# Patient Record
Sex: FEMALE | Race: OTHER
[De-identification: names, ages, dates, MRNs, and addresses within clinical notes are randomized per-mention and may not be internally consistent; named-entity substitution may affect disease eponyms.]

---

## 2018-04-11 ENCOUNTER — HOSPITAL ENCOUNTER (OUTPATIENT)
Dept: HOSPITAL 8 - OUT | Age: 53
End: 2018-04-11
Attending: ORTHOPAEDIC SURGERY
Payer: COMMERCIAL

## 2018-04-11 VITALS — HEIGHT: 65 IN | WEIGHT: 206.35 LBS | BODY MASS INDEX: 34.38 KG/M2

## 2018-04-11 VITALS — DIASTOLIC BLOOD PRESSURE: 87 MMHG | SYSTOLIC BLOOD PRESSURE: 152 MMHG
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
## 2018-04-11 DIAGNOSIS — Z85.6: ICD-10-CM
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
## 2018-04-11 DIAGNOSIS — S43.431A: ICD-10-CM

## 2018-04-11 DIAGNOSIS — M75.41: ICD-10-CM
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
## 2018-04-11 DIAGNOSIS — M75.111: Primary | ICD-10-CM

## 2018-04-11 DIAGNOSIS — E66.9: ICD-10-CM

## 2018-04-11 DIAGNOSIS — M19.011: ICD-10-CM

## 2018-04-11 DIAGNOSIS — M75.01: ICD-10-CM

## 2018-04-11 PROCEDURE — 29827 SHO ARTHRS SRG RT8TR CUF RPR: CPT

## 2018-04-11 PROCEDURE — 29826 SHO ARTHRS SRG DECOMPRESSION: CPT

## 2018-04-11 PROCEDURE — 29825 SHO ARTHRS SRG LSS&RESCJ ADS: CPT

## 2018-04-11 PROCEDURE — C1713 ANCHOR/SCREW BN/BN,TIS/BN: HCPCS

## 2018-04-11 PROCEDURE — 29824 SHO ARTHRS SRG DSTL CLAVICLC: CPT

## 2018-10-12 ENCOUNTER — HOSPITAL ENCOUNTER (EMERGENCY)
Dept: HOSPITAL 8 - ED | Age: 53
Discharge: HOME | End: 2018-10-12
Payer: COMMERCIAL

## 2018-10-12 VITALS — DIASTOLIC BLOOD PRESSURE: 98 MMHG | SYSTOLIC BLOOD PRESSURE: 168 MMHG

## 2018-10-12 VITALS — HEIGHT: 65 IN | WEIGHT: 216.27 LBS | BODY MASS INDEX: 36.03 KG/M2
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
## 2018-10-12 DIAGNOSIS — S39.012A: Primary | ICD-10-CM

## 2018-10-12 PROCEDURE — 99283 EMERGENCY DEPT VISIT LOW MDM: CPT

## 2018-10-12 PROCEDURE — 96372 THER/PROPH/DIAG INJ SC/IM: CPT

## 2019-05-03 ENCOUNTER — APPOINTMENT (OUTPATIENT)
Dept: RADIOLOGY | Facility: MEDICAL CENTER | Age: 54
End: 2019-05-03
Attending: EMERGENCY MEDICINE
Payer: COMMERCIAL

## 2019-05-03 ENCOUNTER — HOSPITAL ENCOUNTER (EMERGENCY)
Facility: MEDICAL CENTER | Age: 54
End: 2019-05-03
Attending: EMERGENCY MEDICINE
Payer: COMMERCIAL

## 2019-05-03 VITALS
OXYGEN SATURATION: 98 % | BODY MASS INDEX: 29.99 KG/M2 | DIASTOLIC BLOOD PRESSURE: 63 MMHG | WEIGHT: 180 LBS | HEART RATE: 74 BPM | SYSTOLIC BLOOD PRESSURE: 157 MMHG | RESPIRATION RATE: 16 BRPM | HEIGHT: 65 IN

## 2019-05-03 DIAGNOSIS — R93.0 ABNORMAL HEAD CT: ICD-10-CM
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
## 2019-05-03 DIAGNOSIS — M19.90 OSTEOARTHRITIS, UNSPECIFIED OSTEOARTHRITIS TYPE, UNSPECIFIED SITE: ICD-10-CM

## 2019-05-03 DIAGNOSIS — S09.90XA CLOSED HEAD INJURY, INITIAL ENCOUNTER: ICD-10-CM

## 2019-05-03 LAB
ALBUMIN SERPL BCP-MCNC: 4.3 G/DL (ref 3.2–4.9)
ALBUMIN/GLOB SERPL: 1.5 G/DL
ALP SERPL-CCNC: 74 U/L (ref 30–99)
ALT SERPL-CCNC: 16 U/L (ref 2–50)
ANION GAP SERPL CALC-SCNC: 13 MMOL/L (ref 0–11.9)
APPEARANCE UR: ABNORMAL
AST SERPL-CCNC: 16 U/L (ref 12–45)
BACTERIA #/AREA URNS HPF: NEGATIVE /HPF
BASOPHILS # BLD AUTO: 0.9 % (ref 0–1.8)
BASOPHILS # BLD: 0.1 K/UL (ref 0–0.12)
BILIRUB SERPL-MCNC: 0.5 MG/DL (ref 0.1–1.5)
BILIRUB UR QL STRIP.AUTO: NEGATIVE
BUN SERPL-MCNC: 22 MG/DL (ref 8–22)
CALCIUM SERPL-MCNC: 9.4 MG/DL (ref 8.5–10.5)
CHLORIDE SERPL-SCNC: 110 MMOL/L (ref 96–112)
CO2 SERPL-SCNC: 21 MMOL/L (ref 20–33)
COLOR UR: YELLOW
CREAT SERPL-MCNC: 0.84 MG/DL (ref 0.5–1.4)
EKG IMPRESSION: NORMAL
EOSINOPHIL # BLD AUTO: 0.13 K/UL (ref 0–0.51)
EOSINOPHIL NFR BLD: 1.2 % (ref 0–6.9)
EPI CELLS #/AREA URNS HPF: NEGATIVE /HPF
ERYTHROCYTE [DISTWIDTH] IN BLOOD BY AUTOMATED COUNT: 43.3 FL (ref 35.9–50)
GLOBULIN SER CALC-MCNC: 2.8 G/DL (ref 1.9–3.5)
GLUCOSE SERPL-MCNC: 120 MG/DL (ref 65–99)
GLUCOSE UR STRIP.AUTO-MCNC: NEGATIVE MG/DL
HCG SERPL QL: NEGATIVE
HCT VFR BLD AUTO: 41 % (ref 37–47)
HGB BLD-MCNC: 13.5 G/DL (ref 12–16)
HYALINE CASTS #/AREA URNS LPF: NORMAL /LPF
IMM GRANULOCYTES # BLD AUTO: 0.04 K/UL (ref 0–0.11)
IMM GRANULOCYTES NFR BLD AUTO: 0.4 % (ref 0–0.9)
KETONES UR STRIP.AUTO-MCNC: 15 MG/DL
LEUKOCYTE ESTERASE UR QL STRIP.AUTO: NEGATIVE
LIPASE SERPL-CCNC: 21 U/L (ref 11–82)
LYMPHOCYTES # BLD AUTO: 2.12 K/UL (ref 1–4.8)
LYMPHOCYTES NFR BLD: 19.9 % (ref 22–41)
MCH RBC QN AUTO: 29 PG (ref 27–33)
MCHC RBC AUTO-ENTMCNC: 32.9 G/DL (ref 33.6–35)
MCV RBC AUTO: 88 FL (ref 81.4–97.8)
MICRO URNS: ABNORMAL
MONOCYTES # BLD AUTO: 0.54 K/UL (ref 0–0.85)
MONOCYTES NFR BLD AUTO: 5.1 % (ref 0–13.4)
NEUTROPHILS # BLD AUTO: 7.72 K/UL (ref 2–7.15)
NEUTROPHILS NFR BLD: 72.5 % (ref 44–72)
NITRITE UR QL STRIP.AUTO: NEGATIVE
NRBC # BLD AUTO: 0 K/UL
NRBC BLD-RTO: 0 /100 WBC
PH UR STRIP.AUTO: 8.5 [PH]
PLATELET # BLD AUTO: 328 K/UL (ref 164–446)
PMV BLD AUTO: 10.4 FL (ref 9–12.9)
POTASSIUM SERPL-SCNC: 3.4 MMOL/L (ref 3.6–5.5)
PROT SERPL-MCNC: 7.1 G/DL (ref 6–8.2)
PROT UR QL STRIP: NEGATIVE MG/DL
RBC # BLD AUTO: 4.66 M/UL (ref 4.2–5.4)
RBC # URNS HPF: NORMAL /HPF
RBC UR QL AUTO: NEGATIVE
SODIUM SERPL-SCNC: 144 MMOL/L (ref 135–145)
SP GR UR STRIP.AUTO: 1.02
UROBILINOGEN UR STRIP.AUTO-MCNC: 0.2 MG/DL
WBC # BLD AUTO: 10.7 K/UL (ref 4.8–10.8)
WBC #/AREA URNS HPF: NORMAL /HPF

## 2019-05-03 PROCEDURE — 73562 X-RAY EXAM OF KNEE 3: CPT | Mod: LT

## 2019-05-03 PROCEDURE — 80053 COMPREHEN METABOLIC PANEL: CPT

## 2019-05-03 PROCEDURE — 70450 CT HEAD/BRAIN W/O DYE: CPT

## 2019-05-03 PROCEDURE — 81001 URINALYSIS AUTO W/SCOPE: CPT

## 2019-05-03 PROCEDURE — 96374 THER/PROPH/DIAG INJ IV PUSH: CPT

## 2019-05-03 PROCEDURE — 73562 X-RAY EXAM OF KNEE 3: CPT | Mod: RT

## 2019-05-03 PROCEDURE — 99285 EMERGENCY DEPT VISIT HI MDM: CPT

## 2019-05-03 PROCEDURE — 84703 CHORIONIC GONADOTROPIN ASSAY: CPT

## 2019-05-03 PROCEDURE — 85025 COMPLETE CBC W/AUTO DIFF WBC: CPT

## 2019-05-03 PROCEDURE — 96375 TX/PRO/DX INJ NEW DRUG ADDON: CPT

## 2019-05-03 PROCEDURE — 73030 X-RAY EXAM OF SHOULDER: CPT | Mod: RT

## 2019-05-03 PROCEDURE — 700111 HCHG RX REV CODE 636 W/ 250 OVERRIDE (IP): Performed by: EMERGENCY MEDICINE

## 2019-05-03 PROCEDURE — 93005 ELECTROCARDIOGRAM TRACING: CPT | Performed by: EMERGENCY MEDICINE

## 2019-05-03 PROCEDURE — 83690 ASSAY OF LIPASE: CPT

## 2019-05-03 PROCEDURE — 71045 X-RAY EXAM CHEST 1 VIEW: CPT

## 2019-05-03 PROCEDURE — 72131 CT LUMBAR SPINE W/O DYE: CPT

## 2019-05-03 PROCEDURE — 36415 COLL VENOUS BLD VENIPUNCTURE: CPT

## 2019-05-03 RX ORDER — ONDANSETRON 2 MG/ML
4 INJECTION INTRAMUSCULAR; INTRAVENOUS ONCE
Status: COMPLETED | OUTPATIENT
Start: 2019-05-03 | End: 2019-05-03

## 2019-05-03 RX ORDER — IBUPROFEN 600 MG/1
600 TABLET ORAL EVERY 8 HOURS PRN
Qty: 15 TAB | Refills: 0 | Status: SHIPPED | OUTPATIENT
Start: 2019-05-03 | End: 2019-05-08

## 2019-05-03 RX ORDER — KETOROLAC TROMETHAMINE 30 MG/ML
30 INJECTION, SOLUTION INTRAMUSCULAR; INTRAVENOUS ONCE
Status: COMPLETED | OUTPATIENT
Start: 2019-05-03 | End: 2019-05-03

## 2019-05-03 RX ORDER — ACETAMINOPHEN 325 MG/1
650 TABLET ORAL EVERY 6 HOURS PRN
Qty: 16 TAB | Refills: 0 | Status: SHIPPED | OUTPATIENT
Start: 2019-05-03 | End: 2019-05-07

## 2019-05-03 RX ADMIN — ONDANSETRON 4 MG: 2 INJECTION INTRAMUSCULAR; INTRAVENOUS at 16:56

## 2019-05-03 RX ADMIN — KETOROLAC TROMETHAMINE 30 MG: 30 INJECTION, SOLUTION INTRAMUSCULAR at 19:34

## 2019-05-03 ASSESSMENT — LIFESTYLE VARIABLES
EVER FELT BAD OR GUILTY ABOUT YOUR DRINKING: NO
EVER HAD A DRINK FIRST THING IN THE MORNING TO STEADY YOUR NERVES TO GET RID OF A HANGOVER: NO
HAVE YOU EVER FELT YOU SHOULD CUT DOWN ON YOUR DRINKING: NO
TOTAL SCORE: 0
DO YOU DRINK ALCOHOL: YES
HAVE PEOPLE ANNOYED YOU BY CRITICIZING YOUR DRINKING: NO
CONSUMPTION TOTAL: INCOMPLETE
TOTAL SCORE: 0
TOTAL SCORE: 0

## 2019-05-03 NOTE — ED PROVIDER NOTES
ED Provider Note    CHIEF COMPLAINT  Fall    [facility]  Gabriela Prince is a 53 y.o. female who presents to the emergency department after a fall.  History is obtained using the iPad  as the patient is Citizen of the [place]speaking.  The patient states that she was at work earlier today when she was leaving work and tripped over the edge of the carpet.  She states that she fell onto a fence and landed on her knees.  She states that she also hit her head on the fence but did not have any loss of consciousness.  She is currently complaining of bilateral knee pain, right shoulder pain, headache, and back pain.  She also states that she was unable to get up as she felt weak and numb all over.  EMS was called by 1 of her coworkers, and she was given ketamine and fentanyl.  She did receive Zofran upon arrival here as she was nauseated and vomiting.  She denies taking any blood thinners and states that she has otherwise been well prior to the fall.  She denies any recent fevers, coughing, chest pain, shortness of breath, abdominal pain, or vomiting.    REVIEW OF SYSTEMS  See HPI for further details. All other systems are negative.     PAST MEDICAL HISTORY   has a past medical history of Cancer (HCC); Hodgkin's disease; Sleep apnea (2011); Snoring; and Unspecified disorder of thyroid.    SOCIAL HISTORY  Social History     Social History Main Topics   • Smoking status: Never Smoker   • Smokeless tobacco: Never Used   • Alcohol use Yes      Comment: occasionally   • Drug use: No   • Sexual activity: Not Currently      Comment:        SURGICAL HISTORY   has a past surgical history that includes  delivery only (); tubal ligation; pelviscopy (3/7/2011); salpingectomy (3/7/2011); and primary c section.    CURRENT MEDICATIONS  Home Medications     Reviewed by Nicole Watson R.N. (Registered Nurse) on 19 at 1613  Med List Status: Complete   Medication Last Dose Status   Ferrous Sulfate (IRON)  "325 (65 FE) MG TABS not taking Active   hydrocodone/acetaminophen (NORCO)  MG TABS not taking Active   OMEPRAZOLE PO not taking Active   VITAMIN D PO not takin Active                ALLERGIES  No Known Allergies    PHYSICAL EXAM  VITAL SIGNS: /63   Pulse 69   Resp 16   Ht 1.651 m (5' 5\")   Wt 81.6 kg (180 lb)   SpO2 99%   BMI 29.95 kg/m²    Constitutional: The patient is alert and laying on the gurney.  She is tearful and appears anxious.  She occasionally retches into an emesis bag.  HENT: Normocephalic atraumatic.  No arguelles signs or raccoon's eyes are noted.  Bilateral external ears normal.  Bilateral TMs are normal with no evidence of hemotympanum.  Nose normal. Mucous membranes are moist.  Eyes: Pupils are equal and reactive. Conjunctiva normal. Non-icteric sclera.   Neck: Normal range of motion without tenderness. Supple. No midline tenderness to palpation.  Cardiovascular: Regular rate and rhythm. No murmurs, gallops or rubs.  Thorax & Lungs: Breath sounds are clear to auscultation bilaterally. No wheezing, rhonchi or rales.  Abdomen: Soft, nontender and nondistended. No peritoneal signs noted.  Rectal: Larry - Sil ED RN.  Normal rectal tone, no gross bleeding.  Skin: Warm and dry. No rashes are noted.  Back: There is minimal tenderness palpation in the lumbar spine, midline.  Extremities: 2+ peripheral pulses. Cap refill is less than 2 seconds. No edema, cyanosis, or clubbing.  Musculoskeletal: Good range of motion in all major joints. No tenderness to palpation or major deformities noted.  She has minimal tenderness to palpation of her bilateral knees, and she is able to actively flex and extend the knees.  No pain with internal/external rotation at the hips bilaterally.  Neurologic: Alert and oriented ×3.  No facial asymmetry is noted.  The patient has 5 out of 5 muscle strength in bilateral upper extremities.  She has 4 out of 5 muscle strength in bilateral lower extremities but " when distracted seems to have 5 out of 5 muscle strength.  She states that she is numb from head to toe but is able to localize where I touch her.  Psychiatric: Affect is anxious. Judgment appears to be intact.      DIAGNOSTIC STUDIES / PROCEDURES    LABS  Results for orders placed or performed during the hospital encounter of 05/03/19   CBC WITH DIFFERENTIAL   Result Value Ref Range    WBC 10.7 4.8 - 10.8 K/uL    RBC 4.66 4.20 - 5.40 M/uL    Hemoglobin 13.5 12.0 - 16.0 g/dL    Hematocrit 41.0 37.0 - 47.0 %    MCV 88.0 81.4 - 97.8 fL    MCH 29.0 27.0 - 33.0 pg    MCHC 32.9 (L) 33.6 - 35.0 g/dL    RDW 43.3 35.9 - 50.0 fL    Platelet Count 328 164 - 446 K/uL    MPV 10.4 9.0 - 12.9 fL    Neutrophils-Polys 72.50 (H) 44.00 - 72.00 %    Lymphocytes 19.90 (L) 22.00 - 41.00 %    Monocytes 5.10 0.00 - 13.40 %    Eosinophils 1.20 0.00 - 6.90 %    Basophils 0.90 0.00 - 1.80 %    Immature Granulocytes 0.40 0.00 - 0.90 %    Nucleated RBC 0.00 /100 WBC    Neutrophils (Absolute) 7.72 (H) 2.00 - 7.15 K/uL    Lymphs (Absolute) 2.12 1.00 - 4.80 K/uL    Monos (Absolute) 0.54 0.00 - 0.85 K/uL    Eos (Absolute) 0.13 0.00 - 0.51 K/uL    Baso (Absolute) 0.10 0.00 - 0.12 K/uL    Immature Granulocytes (abs) 0.04 0.00 - 0.11 K/uL    NRBC (Absolute) 0.00 K/uL   COMP METABOLIC PANEL   Result Value Ref Range    Sodium 144 135 - 145 mmol/L    Potassium 3.4 (L) 3.6 - 5.5 mmol/L    Chloride 110 96 - 112 mmol/L    Co2 21 20 - 33 mmol/L    Anion Gap 13.0 (H) 0.0 - 11.9    Glucose 120 (H) 65 - 99 mg/dL    Bun 22 8 - 22 mg/dL    Creatinine 0.84 0.50 - 1.40 mg/dL    Calcium 9.4 8.5 - 10.5 mg/dL    AST(SGOT) 16 12 - 45 U/L    ALT(SGPT) 16 2 - 50 U/L    Alkaline Phosphatase 74 30 - 99 U/L    Total Bilirubin 0.5 0.1 - 1.5 mg/dL    Albumin 4.3 3.2 - 4.9 g/dL    Total Protein 7.1 6.0 - 8.2 g/dL    Globulin 2.8 1.9 - 3.5 g/dL    A-G Ratio 1.5 g/dL   LIPASE   Result Value Ref Range    Lipase 21 11 - 82 U/L   URINALYSIS CULTURE, IF INDICATED   Result Value Ref  Range    Color Yellow     Character Cloudy (A)     Specific Gravity 1.016 <1.035    Ph 8.5 (A) 5.0 - 8.0    Glucose Negative Negative mg/dL    Ketones 15 (A) Negative mg/dL    Protein Negative Negative mg/dL    Bilirubin Negative Negative    Urobilinogen, Urine 0.2 Negative    Nitrite Negative Negative    Leukocyte Esterase Negative Negative    Occult Blood Negative Negative    Micro Urine Req Microscopic    HCG QUAL SERUM   Result Value Ref Range    Beta-Hcg Qualitative Serum Negative Negative   ESTIMATED GFR   Result Value Ref Range    GFR If African American >60 >60 mL/min/1.73 m 2    GFR If Non African American >60 >60 mL/min/1.73 m 2   URINE MICROSCOPIC (W/UA)   Result Value Ref Range    WBC 0-2 /hpf    RBC 0-2 /hpf    Bacteria Negative None /hpf    Epithelial Cells Negative /hpf    Hyaline Cast 0-2 /lpf   EKG   Result Value Ref Range    Report       [facility] Emergency Dept.    Test Date:  2019  Pt Name:    HANNAH TORIBIO        Department: ER  MRN:        [id number]                      Room:       [facility]  Gender:     Female                       Technician: [id number]  :        1965                   Requested By:KATE BONNER  Order #:    [id number]                    Reading MD: Kate Bonner    Measurements  Intervals                                Axis  Rate:       71                           P:          53  MI:         124                          QRS:        6  QRSD:       74                           T:          26  QT:         468  QTc:        509    Interpretive Statements  SINUS RHYTHM  No evidence of ischemia or arrhythmia is present.  The initial QTC as  cannulated  by the computer was 509.  I manually recalculated this and found it to be  480.  No previous ECG available for comparison.    Electronically Signed On 5-3-2019 17:37:29 PDT by Kate Bonner       RADIOLOGY  CT-LSPINE W/O PLUS RECONS   Final Result      NO ACUTE FRACTURE OR DISLOCATION IS PRESENT IN THE LUMBAR  SPINE.      CT-HEAD W/O   Final Result         NO ACUTE ABNORMALITIES ARE NOTED ON CT SCAN OF THE HEAD.      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      DX-CHEST-PORTABLE (1 VIEW)   Final Result         No acute cardiac or pulmonary abnormality is identified.      DX-SHOULDER 2+ RIGHT   Final Result      There is no evidence of acute fracture.   Osteoarthritis is present.      DX-KNEE 3 VIEWS RIGHT   Final Result      No evidence of fracture or dislocation.   Mild osteoarthritis.      DX-KNEE 3 VIEWS LEFT   Final Result      No evidence of fracture or dislocation.   Mild osteoarthritis.        COURSE & MEDICAL DECISION MAKING  Pertinent Labs & Imaging studies reviewed. (See chart for details)    This is a 53-year-old female presenting to the emergency department for evaluation after a fall.  On initial evaluation, the patient appeared anxious and uncomfortable.  She was intermittently retching into an emesis bag.  Her vital signs were normal.  Physical exam was notable for minimal tenderness palpation in the lumbar spine, but no additional obvious traumatic injuries were noted although physical exam was somewhat difficult secondary to the patient's anxiousness and poor cooperation.  A CT of the head was performed given a history of hitting her head.  This did not reveal any intracranial hemorrhage.  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  I did disclose this information with her and listed in her diagnoses.  A CT of the lumbar spine was also ordered given her tenderness in this area although she was grossly neurologically intact.  No acute fractures or dislocations of the lumbar spine were noted.  Plain films of the knees and right shoulder were also obtained given that this is where she was complaining of pain.  No acute fractures were noted, but mild osteoarthritis was noted and likely contributing to some of her pain.  Her blood work was  reassuring.  EKG did not reveal any evidence of ischemia or arrhythmia.    8:04 PM - Patient reassessed and stated that her pain has completely resolved.  She is resting comfortably on the gurney with normal vital signs.    The patient was able to ambulate without assistance while in the emergency department.  Given the resolution of her symptoms and benign work-up here in the emergency department as well as mild mechanism of injury, I do believe that she is stable for discharge and encouraged her to follow-up with [facility].  She is given a prescription for Tylenol and ibuprofen and understands return to the ED with any worsening signs or symptoms.    FINAL IMPRESSION  1. Fall, initial encounter    2. Closed head injury, initial encounter    3. Abnormal head CT    4. Osteoarthritis, unspecified osteoarthritis type, unspecified site      PRESCRIPTIONS  New Prescriptions    No medications on file     FOLLOW UP  [address] [address]  [phone]  Call in 1 day  To schedule a follow up appointment    [facility], Emergency Dept  [address] [address]  [phone]  Go to   As needed    -DISCHARGE-           Electronically signed by: Kate Torres, 5/3/2019 4:29 PM

## 2019-05-03 NOTE — ED TRIAGE NOTES
Chief Complaint   Patient presents with   • GLF       BIB by ems for above complaint. Patient has a ground level fall after she tripped over something after work. A+Ox4. States she has head pain, pain in both in knees, and right shoulder pain. Patient states that she does not have any feeling in her legs. Patient assessed and CMS intact in bilateral feet. Patient was given 25 of Ketamine, 10 mcg of fentanyl, and 4mg Zofran. Patient is vomiting during triage. Placed on monitor and in gown.

## 2019-05-03 NOTE — LETTER
"  FORM C-4:  EMPLOYEE’S CLAIM FOR COMPENSATION/ REPORT OF INITIAL TREATMENT  EMPLOYEE’S CLAIM - PROVIDE ALL INFORMATION REQUESTED   First Name  Gabriela Last Name  Po Prince Birthdate             Age  1965 53 y.o. 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 (if applicable)  SPEAKING WITH THE OFFICE STAFF    How did this injury or occupational disease occur? Be specific and answer in detail. Use additional sheet if necessary)  I WAS LEAVING WORK OFFICE AND FELL DUE TO THE MAT AND INJURED   MYSELF ON THE RAILING AND INJURED MY RIGHT ARM AND FACE AND KNEES.   If you believe that you have an occupational disease, when did you first have knowledge of the disability and it relationship to your employment?  N/A Witnesses to the Accident  [employer] OFFICE STAFF     Nature of Injury or Occupational Disease  Concussion  Part(s) of Body Injured or Affected  Skull, Lower Arm (R), Knee (R)    I certify that the above is true and correct to " the best of my knowledge and that I have provided this information in order to obtain the benefits of [place]  I hereby authorize any physician, chiropractor, surgeon, practitioner, or other person, any hospital, including [facility], any medical service organization, any insurance company, or other institution or organization to release to each other, any medical or other information, including benefits paid or payable, pertinent to this injury or disease, except information relative to diagnosis, treatment and/or counseling for AIDS, psychological conditions, alcohol or controlled substances, for which I must give specific authorization.  A Photostat of this authorization shall be as valid as the original.   Date Place [facility]   Employee’s Signature   THIS REPORT MUST BE COMPLETED AND MAILED WITHIN 3 WORKING DAYS OF TREATMENT   Place  [facility], EMERGENCY DEPT  Name of Facility   [facility]   Date  5/3/2019 Diagnosis  (W19.XXXA) Fall, initial encounter  (S09.90XA) Closed head injury, initial encounter  (R93.0) Abnormal head CT  (M19.90) Osteoarthritis, unspecified osteoarthritis type, unspecified site Is there evidence the injured employee was under the influence of alcohol and/or another controlled substance at the time of accident?   Hour  5:19 PM Description of Injury or Disease  Fall, initial encounter  Closed head injury, initial encounter  Abnormal head CT  Osteoarthritis, unspecified osteoarthritis type, unspecified site     Treatment     Have you advised the patient to remain off work five days or more?             X-Ray Findings      If Yes   From Date    To Date      From information given by the employee, together with medical evidence, can you directly connect this injury or occupational disease as  "job incurred?    If No, is the employee capable of: Full Duty    Modified Duty      Is additional medical care by a physician indicated?    If Modified Duty, Specify any Limitations / Restrictions        Do you know of any previous injury or disease contributing to this condition or occupational disease?      Date  5/13/2019 Print Doctor’s Name  Melissa Kate HERNANDEZ RAHUL certify the employer’s copy of this form was mailed on:   Address  [address] [place] [address] [phone] Insurer’s Use Only   [facility]  [address]    Provider’s Tax ID Number  [id number] Telephone  Dept: [phone]    Doctor’s Signature    Degree   MD    Original - TREATING PHYSICIAN OR CHIROPRACTOR   Pg 2-Insurer/TPA   Pg 3-Employer   Pg 4-Employee                                                                                                  Form C-4 (rev01/03)     BRIEF DESCRIPTION OF RIGHTS AND BENEFITS  (Pursuant to [place]RS 616C.050)    Notice of Injury or Occupational Disease (Incident Report Form C-1): If an injury or occupational disease (OD) arises out of and in the course of employment, you must provide written notice to your employer as soon as practicable, but no later than 7 days after the accident or OD. Your employer shall maintain a sufficient supply of the required forms.    Claim for Compensation (Form C-4): If medical treatment is sought, the form C-4 is available at the place of initial treatment. A completed \"Claim for Compensation\" (Form C-4) must be filed within 90 days after an accident or OD. The treating physician or chiropractor must, within 3 working days after treatment, complete and mail to the employer, the employer's insurer and third-party , the Claim for Compensation.    Medical Treatment: If you require medical treatment for your on-the-job injury or OD, you may be required to select a physician or chiropractor from a list provided by your workers’ compensation insurer, if it has " contracted with an Organization for Managed Care (MCO) or Preferred Provider Organization (PPO) or providers of health care. If your employer has not entered into a contract with an MCO or PPO, you may select a physician or chiropractor from the Panel of Physicians and Chiropractors. Any medical costs related to your industrial injury or OD will be paid by your insurer.    Temporary Total Disability (TTD): If your doctor has certified that you are unable to work for a period of at least 5 consecutive days, or 5 cumulative days in a 20-day period, or places restrictions on you that your employer does not accommodate, you may be entitled to TTD compensation.    Temporary Partial Disability (TPD): If the wage you receive upon reemployment is less than the compensation for TTD to which you are entitled, the insurer may be required to pay you TPD compensation to make up the difference. TPD can only be paid for a maximum of 24 months.    Permanent Partial Disability (PPD): When your medical condition is stable and there is an indication of a PPD as a result of your injury or OD, within 30 days, your insurer must arrange for an evaluation by a rating physician or chiropractor to determine the degree of your PPD. The amount of your PPD award depends on the date of injury, the results of the PPD evaluation and your age and wage.    Permanent Total Disability (PTD): If you are medically certified by a treating physician or chiropractor as permanently and totally disabled and have been granted a PTD status by your insurer, you are entitled to receive monthly benefits not to exceed 66 2/3% of your average monthly wage. The amount of your PTD payments is subject to reduction if you previously received a PPD award.    Vocational Rehabilitation Services: You may be eligible for vocational rehabilitation services if you are unable to return to the job due to a permanent physical impairment or permanent restrictions as a result of  your injury or occupational disease.    Transportation and Per [place] Reimbursement: You may be eligible for travel expenses and per [place] associated with medical treatment.  Reopening: You may be able to reopen your claim if your condition worsens after claim closure.    Appeal Process: If you disagree with a written determination issued by the insurer or the insurer does not respond to your request, you may appeal to the Department of Administration, , by following the instructions contained in your determination letter. You must appeal the determination within 70 days from the date of the determination letter at [address], [address], [place], [place] [address], or [address], [address], [place], [place] [address]. If you disagree with the  decision, you may appeal to the Department of Administration, . You must file your appeal within 30 days from the date of the  decision letter at [address], [address], [place], [place] [address], or [address], [facility] [address], [place] [address]. If you disagree with a decision of an , you may file a petition for judicial review with the District Court. You must do so within 30 days of the Appeal Officer’s decision. You may be represented by an  at your own expense or you may contact the [facility] for possible representation.    [place]  for Injured Workers (NAIW): If you disagree with a  decision, you may request that NAIW represent you without charge at an  Hearing. For information regarding denial of benefits, you may contact the [facility] at: [address], [address], [place] [address], [phone], or [address] [facility] [address], [place] [address], [phone]    To File a Complaint with the Division: If you wish to file a complaint with the  of the Division of Industrial Relations (DIR),  please contact the Workers’ Compensation Section, [address], [address], [place], [place] [address], telephone (180) [phone], or [address], [address], [place], [place] [address], telephone [phone]) [phone].    For assistance with Workers’ Compensation Issues: you may contact the Office of the [facility] Consumer Health Assistance, [address] [place] [address], [address], [place], [place] [address], Toll Free 1-614.367.3827, Web site: http://govcha.[facility].nv., E-mail [email or web address].                                                                                                                                                                               __________________________________________________________________                                    _________________            Employee Name / Signature                                                                                                                            Date                                       D-2 (rev. 10/07)

## 2019-05-04 NOTE — ED NOTES
Urine specimen provided by patient sent to lab per orders.    Patient states that pain has improved to 5/10 since receiving medication per orders. No behavioral pain indicators noted.    Denies further needs at this time. Call bell within reach.

## 2019-05-04 NOTE — ED NOTES
Pt evaluated by ERP. PIV line established. Blood drawn and sent to the lab. Pt medicated for N/V, see MAR. Family at bedside.

## 2019-05-04 NOTE — ED NOTES
Pt and family updated on POC. Pt unable to ambulate d/t pain, informed urine sample needed. ERP notified. Report given to CHAPARRO Whiting

## 2019-05-04 NOTE — ED NOTES
Patient resting on gurney. No acute distress. Calm, cooperative, conversing appropriately with staff. States that pain has improved to 8/10 since receiving medication per orders. No behavioral pain indicators noted. Informed of need for urine specimen per orders. States that she does not feel that she can ambulate at this time due to pain - states she will try after receiving addition al medication. Medicated with Toradol per orders. Denies further needs at this time. Call bell within reach. Family remains at bedside. Patient and family updated regarding plan of care.

## 2019-05-04 NOTE — ED NOTES
Patient ambulatory to BR with steady gait with constant SBA by ED Tech. Specimen container provided and instructed on clean catch urine sample - verbalized understanding.

## 2019-05-04 NOTE — ED NOTES
Patient discharged in stable condition per orders. IV access removed - bandage applied. Wristband removed per protocol. Patient verbalized understanding of all discharge instructions. All belongings accounted for. Ambulatory to lobby with steady gait accompanied by family.

## 2019-08-26 ENCOUNTER — OCCUPATIONAL MEDICINE (OUTPATIENT)
Dept: OCCUPATIONAL MEDICINE | Facility: CLINIC | Age: 54
End: 2019-08-26
Payer: COMMERCIAL

## 2019-08-26 VITALS
WEIGHT: 200 LBS | SYSTOLIC BLOOD PRESSURE: 120 MMHG | OXYGEN SATURATION: 98 % | TEMPERATURE: 98.6 F | DIASTOLIC BLOOD PRESSURE: 88 MMHG | HEIGHT: 65 IN | BODY MASS INDEX: 33.32 KG/M2 | HEART RATE: 78 BPM

## 2019-08-26 DIAGNOSIS — F07.81 POST CONCUSSIVE SYNDROME: ICD-10-CM

## 2019-08-26 PROCEDURE — 99203 OFFICE O/P NEW LOW 30 MIN: CPT | Performed by: PREVENTIVE MEDICINE

## 2019-08-26 NOTE — LETTER
[address],   [address] [place] [address]  Phone:  [phone] - Fax:  [phone]   [facility] Progress Report and Disability Certification  Date of Service: 8/26/2019   No Show:  No  Date / Time of Next Visit:  [facility]   Claim Information   Patient Name: Gabriela Prince  Claim Number:     Employer:   [employer] Date of Injury: 5/3/2019     Insurer / TPA: Feliz Vogt  ID / SSN:     Occupation: PRODUCTION  Diagnosis: The encounter diagnosis was Post concussive syndrome.    Medical Information   Related to Industrial Injury? Yes    Subjective Complaints:  DOI 5/3/2019: 55 yo female presents with right knee, head and right shoulder injury. She was leaving work and tripped over the edge of the carpet.  She states that she fell onto a fence and landed on her knees. She was seen in ER and [facility].  Patient was treated at [facility] for few months.  She states she was just referred to orthopedist for her right shoulder and to a neurologist for her postconcussive syndrome.  She states that she was told that this appointment was to be seen by neurologist.  Patient states she continues to have headaches and feeling of weight upon her head and neck.  She had MRIs were essentially normal.   Objective Findings: Neuro: Alert oriented x3.  Cranial nerves grossly intact.  EOMI.   Pre-Existing Condition(s):     Assessment:   Condition Same    Status: Discharged / Care Transfer  Permanent Disability:No    Plan:      Diagnostics:      Comments:  Patient currently treating with [facility] for postconcussive syndrome, she is under the belief that she was to be seen by neurologist here at this appointment.  I Am unsure exactly why patient was sent here, however I do agree with referral to comfort cho.  Recommend Dr. Vital if available  Advised patient to continue care with [facility] unless she desires to transfer care over here  Advised  patient to contact work comp insurance for further clarification of status of neurology referral  Restrict  ions per [facility]  Please excuse patient from work this afternoon, 8/26/2019 due to her coming to this appointment    Disability Information   Status: Released to Restricted Duty    From:  8/26/2019  Through:   Restrictions are: Temporary   Physical Restrictions   Sitting:    Standing:    Stooping:    Bending:      Squatting:    Walking:    Climbing:    Pushing:      Pulling:    Other:    Reaching Above Shoulder (L):   Reaching Above Shoulder (R):       Reaching Below Shoulder (L):    Reaching Below Shoulder (R):      Not to exceed Weight Limits   Carrying(hrs):   Weight Limit(lb):   Lifting(hrs):   Weight  Limit(lb):     Comments: Per [facility]opedics    Repetitive Actions   Hands: i.e. Fine Manipulations from Grasping:     Feet: i.e. Operating Foot Controls:     Driving / Operate Machinery:     Physician Name: Noe Hong D.O. Physician Signature: [email or web address] HONG D.O. e-Signature: Dr. Baljeet John, Medical Director   Clinic Name / Location: [address],   [address] [place] [address] Clinic Phone Number: Dept: [phone]   Appointment Time: 2:00 Pm Visit Start Time: 1:56 PM   Check-In Time:  1:55 Pm Visit Discharge Time: 2:31 PM   Original-Treating Physician or Chiropractor    Page 2-Insurer/TPA    Page 3-Employer    Page 4-Employee

## 2019-08-26 NOTE — PROGRESS NOTES
"Subjective:      Gabriela Prince is a 54 y.o. female who presents with Other (WC DOI 5/3/19 head, shoulder, knee feeling the same, room 16)      DOI 5/3/2019: 53 yo female presents with right knee, head and right shoulder injury. She was leaving work and tripped over the edge of the carpet.  She states that she fell onto a fence and landed on her knees. She was seen in ER and [facility].  Patient was treated at [facility] for few months.  She states she was just referred to orthopedist for her right shoulder and to a neurologist for her postconcussive syndrome.  She states that she was told that this appointment was to be seen by neurologist.  Patient states she continues to have headaches and feeling of weight upon her head and neck.  She had MRIs were essentially normal.     HPI    ROS  ROS: All systems were reviewed on intake form, form was reviewed and signed. See scanned documents in media. Pertinent positives and negatives included in HPI.    PMH: No pertinent past medical history to this problem  MEDS: Medications were reviewed in Epic  ALLERGIES: No Known Allergies  SOCHX: Works as a fresh  at [employer]   FH: No pertinent family history to this problem     Objective:     /88   Pulse 78   Temp 37 °C (98.6 °F)   Ht 1.651 m (5' 5\")   Wt 90.7 kg (200 lb)   SpO2 98%   BMI 33.28 kg/m²      Physical Exam   Constitutional: She is oriented to person, place, and time. She appears well-developed and well-nourished.   HENT:   Right Ear: External ear normal.   Left Ear: External ear normal.   Eyes: Conjunctivae and EOM are normal.   Cardiovascular: Normal rate.   Pulmonary/Chest: Effort normal.   Neurological: She is alert and oriented to person, place, and time.   Skin: Skin is warm and dry.   Psychiatric: She has a normal mood and affect. Her behavior is normal. Judgment normal.       Neuro: Alert oriented x3.  Cranial nerves grossly intact.  EOMI.       Assessment/Plan:     1. Post concussive " syndrome  Patient currently treating with [facility] for postconcussive syndrome, she is under the belief that she was to be seen by neurologist here at this appointment.  I Am unsure exactly why patient was sent here, however I do agree with referral to neurology.  Recommend Dr. Vital if available  Advised patient to continue care with [facility] unless she desires to transfer care over here  Advised patient to contact work comp insurance for further clarification of status of neurology referral  Restrictions per [facility]/orthopedics  Please excuse patient from work this afternoon, 8/26/2019 due to her coming to this appointment

## 2019-11-06 ENCOUNTER — HOSPITAL ENCOUNTER (EMERGENCY)
Facility: MEDICAL CENTER | Age: 54
End: 2019-11-06
Attending: EMERGENCY MEDICINE
Payer: COMMERCIAL

## 2019-11-06 ENCOUNTER — APPOINTMENT (OUTPATIENT)
Dept: RADIOLOGY | Facility: MEDICAL CENTER | Age: 54
End: 2019-11-06
Attending: EMERGENCY MEDICINE
Payer: COMMERCIAL

## 2019-11-06 VITALS
DIASTOLIC BLOOD PRESSURE: 101 MMHG | HEIGHT: 65 IN | TEMPERATURE: 98.7 F | HEART RATE: 67 BPM | SYSTOLIC BLOOD PRESSURE: 149 MMHG | RESPIRATION RATE: 16 BRPM | BODY MASS INDEX: 34.78 KG/M2 | WEIGHT: 208.78 LBS | OXYGEN SATURATION: 98 %

## 2019-11-06 DIAGNOSIS — B34.9 VIRAL ILLNESS: ICD-10-CM

## 2019-11-06 DIAGNOSIS — E87.6 HYPOKALEMIA: ICD-10-CM

## 2019-11-06 LAB
ALBUMIN SERPL BCP-MCNC: 4.3 G/DL (ref 3.2–4.9)
ALBUMIN/GLOB SERPL: 1.5 G/DL
ALP SERPL-CCNC: 78 U/L (ref 30–99)
ALT SERPL-CCNC: 12 U/L (ref 2–50)
ANION GAP SERPL CALC-SCNC: 11 MMOL/L (ref 0–11.9)
APPEARANCE UR: CLEAR
AST SERPL-CCNC: 13 U/L (ref 12–45)
BACTERIA #/AREA URNS HPF: NEGATIVE /HPF
BASOPHILS # BLD AUTO: 1 % (ref 0–1.8)
BASOPHILS # BLD: 0.09 K/UL (ref 0–0.12)
BILIRUB SERPL-MCNC: 0.3 MG/DL (ref 0.1–1.5)
BILIRUB UR QL STRIP.AUTO: NEGATIVE
BUN SERPL-MCNC: 15 MG/DL (ref 8–22)
CALCIUM SERPL-MCNC: 9.6 MG/DL (ref 8.5–10.5)
CHLORIDE SERPL-SCNC: 109 MMOL/L (ref 96–112)
CO2 SERPL-SCNC: 23 MMOL/L (ref 20–33)
COLOR UR: YELLOW
CREAT SERPL-MCNC: 0.65 MG/DL (ref 0.5–1.4)
EOSINOPHIL # BLD AUTO: 0.3 K/UL (ref 0–0.51)
EOSINOPHIL NFR BLD: 3.2 % (ref 0–6.9)
EPI CELLS #/AREA URNS HPF: NEGATIVE /HPF
ERYTHROCYTE [DISTWIDTH] IN BLOOD BY AUTOMATED COUNT: 41.1 FL (ref 35.9–50)
FLUAV RNA SPEC QL NAA+PROBE: NEGATIVE
FLUBV RNA SPEC QL NAA+PROBE: NEGATIVE
GLOBULIN SER CALC-MCNC: 2.9 G/DL (ref 1.9–3.5)
GLUCOSE SERPL-MCNC: 101 MG/DL (ref 65–99)
GLUCOSE UR STRIP.AUTO-MCNC: NEGATIVE MG/DL
HCT VFR BLD AUTO: 40.7 % (ref 37–47)
HGB BLD-MCNC: 13.7 G/DL (ref 12–16)
HYALINE CASTS #/AREA URNS LPF: ABNORMAL /LPF
IMM GRANULOCYTES # BLD AUTO: 0.04 K/UL (ref 0–0.11)
IMM GRANULOCYTES NFR BLD AUTO: 0.4 % (ref 0–0.9)
KETONES UR STRIP.AUTO-MCNC: NEGATIVE MG/DL
LEUKOCYTE ESTERASE UR QL STRIP.AUTO: ABNORMAL
LYMPHOCYTES # BLD AUTO: 2.55 K/UL (ref 1–4.8)
LYMPHOCYTES NFR BLD: 27.1 % (ref 22–41)
MCH RBC QN AUTO: 28.8 PG (ref 27–33)
MCHC RBC AUTO-ENTMCNC: 33.7 G/DL (ref 33.6–35)
MCV RBC AUTO: 85.7 FL (ref 81.4–97.8)
MICRO URNS: ABNORMAL
MONOCYTES # BLD AUTO: 0.5 K/UL (ref 0–0.85)
MONOCYTES NFR BLD AUTO: 5.3 % (ref 0–13.4)
NEUTROPHILS # BLD AUTO: 5.93 K/UL (ref 2–7.15)
NEUTROPHILS NFR BLD: 63 % (ref 44–72)
NITRITE UR QL STRIP.AUTO: NEGATIVE
NRBC # BLD AUTO: 0 K/UL
NRBC BLD-RTO: 0 /100 WBC
PH UR STRIP.AUTO: 5 [PH] (ref 5–8)
PLATELET # BLD AUTO: 339 K/UL (ref 164–446)
PMV BLD AUTO: 10.1 FL (ref 9–12.9)
POTASSIUM SERPL-SCNC: 3 MMOL/L (ref 3.6–5.5)
PROT SERPL-MCNC: 7.2 G/DL (ref 6–8.2)
PROT UR QL STRIP: NEGATIVE MG/DL
RBC # BLD AUTO: 4.75 M/UL (ref 4.2–5.4)
RBC # URNS HPF: ABNORMAL /HPF
RBC UR QL AUTO: NEGATIVE
SODIUM SERPL-SCNC: 143 MMOL/L (ref 135–145)
SP GR UR STRIP.AUTO: 1.02
UROBILINOGEN UR STRIP.AUTO-MCNC: 0.2 MG/DL
WBC # BLD AUTO: 9.4 K/UL (ref 4.8–10.8)
WBC #/AREA URNS HPF: ABNORMAL /HPF

## 2019-11-06 PROCEDURE — 85025 COMPLETE CBC W/AUTO DIFF WBC: CPT

## 2019-11-06 PROCEDURE — A9270 NON-COVERED ITEM OR SERVICE: HCPCS | Performed by: STUDENT IN AN ORGANIZED HEALTH CARE EDUCATION/TRAINING PROGRAM

## 2019-11-06 PROCEDURE — 81001 URINALYSIS AUTO W/SCOPE: CPT

## 2019-11-06 PROCEDURE — 80053 COMPREHEN METABOLIC PANEL: CPT

## 2019-11-06 PROCEDURE — 700102 HCHG RX REV CODE 250 W/ 637 OVERRIDE(OP): Performed by: STUDENT IN AN ORGANIZED HEALTH CARE EDUCATION/TRAINING PROGRAM

## 2019-11-06 PROCEDURE — 71046 X-RAY EXAM CHEST 2 VIEWS: CPT

## 2019-11-06 PROCEDURE — 700111 HCHG RX REV CODE 636 W/ 250 OVERRIDE (IP): Performed by: EMERGENCY MEDICINE

## 2019-11-06 PROCEDURE — 87502 INFLUENZA DNA AMP PROBE: CPT

## 2019-11-06 PROCEDURE — 99284 EMERGENCY DEPT VISIT MOD MDM: CPT

## 2019-11-06 RX ORDER — POTASSIUM CHLORIDE 20 MEQ/1
20 TABLET, EXTENDED RELEASE ORAL ONCE
Status: COMPLETED | OUTPATIENT
Start: 2019-11-06 | End: 2019-11-06

## 2019-11-06 RX ORDER — DEXAMETHASONE SODIUM PHOSPHATE 10 MG/ML
16 INJECTION, SOLUTION INTRAMUSCULAR; INTRAVENOUS ONCE
Status: COMPLETED | OUTPATIENT
Start: 2019-11-06 | End: 2019-11-06

## 2019-11-06 RX ORDER — POTASSIUM CHLORIDE 20 MEQ/1
20 TABLET, EXTENDED RELEASE ORAL 2 TIMES DAILY
Qty: 20 TAB | Refills: 0 | Status: SHIPPED | OUTPATIENT
Start: 2019-11-06 | End: 2019-11-11

## 2019-11-06 RX ADMIN — DEXAMETHASONE SODIUM PHOSPHATE 16 MG: 10 INJECTION INTRAMUSCULAR; INTRAVENOUS at 16:55

## 2019-11-06 RX ADMIN — POTASSIUM CHLORIDE 20 MEQ: 1500 TABLET, EXTENDED RELEASE ORAL at 16:19

## 2019-11-06 NOTE — ED TRIAGE NOTES
Pt ambs to triage  Chief Complaint   Patient presents with   • Flu Like Symptoms     Cough, fever, bilateral ear pain, fever, gen body aches x 2 days   flu swab sent

## 2019-11-06 NOTE — ED PROVIDER NOTES
ED Provider Note    CHIEF COMPLAINT  Chief Complaint   Patient presents with   • Flu Like Symptoms     Cough, fever, bilateral ear pain, fever, gen body aches x 2 days       HPI  Gabriela Prince is a 54 y.o. female who presents complaining of flulike symptoms.  History is facilitated through her daughter for Belarusian translation.  Reports symptoms of fever/chills, myalgias that started last Thursday (6 days ago).  She tried NyQuil with no alleviation.  Positive for cough with gray/green sputum production, sore throat.  Positive for 10 pound weight loss over the past 1 to 2 months.  Does have a history of Hodgkin's lymphoma many years ago.  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        ALLERGIES  No Known Allergies    CURRENT MEDICATIONS  Home Medications     Reviewed by Delaney Oh R.N. (Registered Nurse) on 19 at 1336  Med List Status: <None>   Medication Last Dose Status   Ferrous Sulfate (IRON) 325 (65 FE) MG TABS  Active   hydrocodone/acetaminophen (NORCO)  MG TABS  Active   OMEPRAZOLE PO  Active   VITAMIN D PO  Active                PAST MEDICAL HISTORY   has a past medical history of Cancer (HCC), Hodgkin's disease, Sleep apnea (2011), Snoring, and Unspecified disorder of thyroid.    SURGICAL HISTORY   has a past surgical history that includes  delivery only (); tubal ligation; pelviscopy (3/7/2011); salpingectomy (3/7/2011); and primary c section.    SOCIAL HISTORY  Social History     Tobacco Use   • Smoking status: Never Smoker   • Smokeless tobacco: Never Used   Substance and Sexual Activity   • Alcohol use: Yes     Comment: occasionally   • Drug use: No   • Sexual activity: Not Currently     Comment:        Family Hx:  No history of lung cancer      REVIEW OF SYSTEMS  See HPI for further details.  All other systems are negative except as above in HPI.      PHYSICAL EXAM  VITAL SIGNS: /87   Pulse 82    "Temp 36.5 °C (97.7 °F) (Temporal)   Resp 20   Ht 1.651 m (5' 5\")   Wt 94.7 kg (208 lb 12.4 oz)   SpO2 98%   BMI 34.74 kg/m²     General:  WDWN, NAD, though tired-appearing.  A+Ox3; V/S as above.   Skin: warm and dry; good color; no rash  HEENT: NCAT; EOMs intact; PERRL; no scleral icterus, no oropharyngeal exudate/erythema.   Neck: FROM; no meningismus, no LAD  Cardiovascular: Regular heart rate and rhythm.  No murmurs, rubs, or gallops; pulses 2+ bilaterally radially and DP areas.   Lungs: Clear to auscultation with good air movement bilaterally.  No wheezes, rhonchi, or rales.   Abdomen: BS present; soft; NTND; no rebound, guarding, or rigidity.  No organomegaly or pulsatile mass; no CVAT   Psychiatric: Appropriate affect, normal mood    LABS  Results for orders placed or performed during the hospital encounter of 11/06/19   Influenza A/B By PCR (Adult - Flu Only)   Result Value Ref Range    Influenza virus A RNA Negative Negative    Influenza virus B, PCR Negative Negative   CBC WITH DIFFERENTIAL   Result Value Ref Range    WBC 9.4 4.8 - 10.8 K/uL    RBC 4.75 4.20 - 5.40 M/uL    Hemoglobin 13.7 12.0 - 16.0 g/dL    Hematocrit 40.7 37.0 - 47.0 %    MCV 85.7 81.4 - 97.8 fL    MCH 28.8 27.0 - 33.0 pg    MCHC 33.7 33.6 - 35.0 g/dL    RDW 41.1 35.9 - 50.0 fL    Platelet Count 339 164 - 446 K/uL    MPV 10.1 9.0 - 12.9 fL    Neutrophils-Polys 63.00 44.00 - 72.00 %    Lymphocytes 27.10 22.00 - 41.00 %    Monocytes 5.30 0.00 - 13.40 %    Eosinophils 3.20 0.00 - 6.90 %    Basophils 1.00 0.00 - 1.80 %    Immature Granulocytes 0.40 0.00 - 0.90 %    Nucleated RBC 0.00 /100 WBC    Neutrophils (Absolute) 5.93 2.00 - 7.15 K/uL    Lymphs (Absolute) 2.55 1.00 - 4.80 K/uL    Monos (Absolute) 0.50 0.00 - 0.85 K/uL    Eos (Absolute) 0.30 0.00 - 0.51 K/uL    Baso (Absolute) 0.09 0.00 - 0.12 K/uL    Immature Granulocytes (abs) 0.04 0.00 - 0.11 K/uL    NRBC (Absolute) 0.00 K/uL   COMP METABOLIC PANEL   Result Value Ref Range    " Sodium 143 135 - 145 mmol/L    Potassium 3.0 (L) 3.6 - 5.5 mmol/L    Chloride 109 96 - 112 mmol/L    Co2 23 20 - 33 mmol/L    Anion Gap 11.0 0.0 - 11.9    Glucose 101 (H) 65 - 99 mg/dL    Bun 15 8 - 22 mg/dL    Creatinine 0.65 0.50 - 1.40 mg/dL    Calcium 9.6 8.5 - 10.5 mg/dL    AST(SGOT) 13 12 - 45 U/L    ALT(SGPT) 12 2 - 50 U/L    Alkaline Phosphatase 78 30 - 99 U/L    Total Bilirubin 0.3 0.1 - 1.5 mg/dL    Albumin 4.3 3.2 - 4.9 g/dL    Total Protein 7.2 6.0 - 8.2 g/dL    Globulin 2.9 1.9 - 3.5 g/dL    A-G Ratio 1.5 g/dL   URINALYSIS (UA)   Result Value Ref Range    Color Yellow     Character Clear     Specific Gravity 1.022 <1.035    Ph 5.0 5.0 - 8.0    Glucose Negative Negative mg/dL    Ketones Negative Negative mg/dL    Protein Negative Negative mg/dL    Bilirubin Negative Negative    Urobilinogen, Urine 0.2 Negative    Nitrite Negative Negative    Leukocyte Esterase Moderate (A) Negative    Occult Blood Negative Negative    Micro Urine Req Microscopic    ESTIMATED GFR   Result Value Ref Range    GFR If African American >60 >60 mL/min/1.73 m 2    GFR If Non African American >60 >60 mL/min/1.73 m 2   URINE MICROSCOPIC (W/UA)   Result Value Ref Range    WBC 5-10 (A) /hpf    RBC 0-2 /hpf    Bacteria Negative None /hpf    Epithelial Cells Negative /hpf    Hyaline Cast 0-2 /lpf           IMAGING  DX-CHEST-2 VIEWS   Final Result      No acute cardiopulmonary findings.        MEDICAL RECORD  I have reviewed patient's medical record and pertinent results are listed below.      COURSE & MEDICAL DECISION MAKING  I have reviewed any medical record information, laboratory studies and radiographic results as noted.    Gabriela Prince is a 54 y.o. female who presents complaining of flulike symptoms.  Patient's weight loss is concerning.  CBC and CMP are unremarkable apart from K 3.0.  Chest x-ray unremarkable.  Urinalysis demonstrates no evidence of UTI.  Given above patient likely with viral URI with symptoms possibly  feeling worse also from hypokalemia.  She was given potassium 20 mEq and instructed regarding supportive management to her URI.  Patient was referred to family medicine and [place] [facility] and advised to follow-up with both regarding resolution of her symptoms and her weight loss.  She was also advised to come back to the ER for worsening symptoms, difficulty breathing, or other concerns.    FINAL IMPRESSION  1. Viral illness     2. Hypokalemia           Electronically signed by: Martha Villanueva, 11/6/2019 2:47 PM

## 2019-11-07 NOTE — DISCHARGE INSTRUCTIONS
Take Tylenol and/or Motrin as needed for body aches and fever    Return to the ER for worsening symptoms such as vomiting, headache, rash, or other concerns    Establish care with a primary care provider at the [facility] or [facility] clinics.  You may also call Dr. Patterson for an appointment.    Take potassium as prescribed as it was low today.

## 2019-11-07 NOTE — ED NOTES
Pt VU dcis with strict return precautions and follow up with pcp. Pt left A&Ox4 ambulatory with steady gait to discharge. Pt left with dtr.

## 2020-02-25 PROBLEM — Q04.8 COLPOCEPHALY (HCC): Status: ACTIVE | Noted: 2020-02-25

## 2020-02-25 PROBLEM — Q04.0 AGENESIS OF CORPUS CALLOSUM (HCC): Status: ACTIVE | Noted: 2020-02-25

## 2021-02-15 ENCOUNTER — TELEPHONE (OUTPATIENT)
Dept: SCHEDULING | Facility: IMAGING CENTER | Age: 56
End: 2021-02-15

## 2021-04-12 ENCOUNTER — OFFICE VISIT (OUTPATIENT)
Dept: MEDICAL GROUP | Facility: PHYSICIAN GROUP | Age: 56
End: 2021-04-12
Payer: COMMERCIAL

## 2021-04-12 ENCOUNTER — TELEPHONE (OUTPATIENT)
Dept: MEDICAL GROUP | Facility: PHYSICIAN GROUP | Age: 56
End: 2021-04-12

## 2021-04-12 VITALS
OXYGEN SATURATION: 97 % | TEMPERATURE: 97.4 F | BODY MASS INDEX: 33.46 KG/M2 | SYSTOLIC BLOOD PRESSURE: 140 MMHG | HEART RATE: 64 BPM | WEIGHT: 200.84 LBS | HEIGHT: 65 IN | DIASTOLIC BLOOD PRESSURE: 80 MMHG

## 2021-04-12 DIAGNOSIS — R03.0 ELEVATED BLOOD PRESSURE READING: ICD-10-CM

## 2021-04-12 DIAGNOSIS — M54.2 ACUTE NECK PAIN: ICD-10-CM

## 2021-04-12 DIAGNOSIS — Z12.31 ENCOUNTER FOR SCREENING MAMMOGRAM FOR MALIGNANT NEOPLASM OF BREAST: ICD-10-CM

## 2021-04-12 DIAGNOSIS — Z12.11 SCREENING FOR COLON CANCER: ICD-10-CM

## 2021-04-12 PROCEDURE — 99204 OFFICE O/P NEW MOD 45 MIN: CPT | Performed by: FAMILY MEDICINE

## 2021-04-12 ASSESSMENT — FIBROSIS 4 INDEX: FIB4 SCORE: 0.59

## 2021-04-12 ASSESSMENT — PATIENT HEALTH QUESTIONNAIRE - PHQ9: CLINICAL INTERPRETATION OF PHQ2 SCORE: 0

## 2021-04-12 NOTE — LETTER
April 12, 2021         Patient: Gabriela Prince   YOB: 1965   Date of Visit: 4/12/2021           To Whom it May Concern:    Gabriela Prince was seen in my clinic on 4/12/2021. She is excused from work on 4/12/2021 due to illness.    If you have any questions or concerns, please don't hesitate to call.        Sincerely,           Shira Husain M.D.  Electronically Signed

## 2021-04-13 RX ORDER — CYCLOBENZAPRINE HCL 5 MG
5 TABLET ORAL
COMMUNITY
Start: 2021-02-26

## 2021-04-13 RX ORDER — METHYLPREDNISOLONE 4 MG/1
TABLET ORAL
COMMUNITY
Start: 2021-03-22

## 2021-04-13 RX ORDER — NAPROXEN 500 MG/1
TABLET ORAL
COMMUNITY
Start: 2021-03-20

## 2021-04-13 NOTE — PROGRESS NOTES
Subjective:      Gabriela Prince is a 55 y.o. female who presents with New Patient (est)            HPI     This is a 55-year-old  female who is here as a new patient to get established with me.  She is here with her son who helps translate for her.  She can speak some English and understand as well.  She had no prior primary care physician.    She complains of pain on both sides of the neck going down the trapezius area for about a week now.  The pain is more intense around the left ear and the left side of the head.  There is pain with pressure on the external ear.  Denies any hearing loss, ear discharge, fever, chills, cough or cold symptoms.  Denies any trauma to the area.  Denies any pain going down the arms.  Denies any numbness or tingling down the arms.  She was off work for 1 month because of a work-related injury involving the right wrist/right thumb.  She went back to work 3 weeks ago.  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  Patient is already taking anti-inflammatories prescribed by another physician for the work-related injury involving her right wrist and right thumb.  She will call us with the name of these anti-inflammatories.    Blood pressure is slightly elevated today but she has no history of hypertension.  This could be related to her pain.    She is also due for screening mammogram.  It has been a while since the last one.  She also is due for screening colonoscopy.  She is 55 years old and has not had 1 before.  She also needs GYN exam/Pap smear.  She is due for immunizations.  She already had her first dose of the COVID-19 shot and so we will give all the vaccines are due once she completed the COVID-19 series.    I reviewed the following    Past Medical History:   Diagnosis Date   • Cancer (HCC)     hodgkins lymphoma till 2008   • Hodgkin's disease    • Sleep apnea march 2011    does not use cpap now   • Snoring    •  Unspecified disorder of thyroid         Past Surgical History:   Procedure Laterality Date   • SHOULDER ARTHROSCOPY Right     SAQIB   • PELVISCOPY  3/7/2011    Performed by MADELINE HOUSTON at SURGERY SAME DAY [facility] ORS   • SALPINGECTOMY  3/7/2011    Performed by MADELINE HOUSTON at SURGERY SAME DAY [facility] ORS   • PB  DELIVERY ONLY     • PRIMARY C SECTION      x1   • TUBAL LIGATION         No Known Allergies    Current Outpatient Medications   Medication Sig Dispense Refill   • NAPROXEN PO Take  by mouth.     • OMEPRAZOLE PO Take 1 Tab by mouth every day.       No current facility-administered medications for this visit.        Family History   Problem Relation Age of Onset   • Diabetes Mother    • Heart Disease Father         MI   • Alcohol abuse Father    • No Known Problems Sister    • No Known Problems Brother    • Cancer Neg Hx    • Stroke Neg Hx        Social History     Socioeconomic History   • Marital status:      Spouse name: Not on file   • Number of children: 5   • Years of education: Not on file   • Highest education level: Not on file   Occupational History   • Occupation: warehouse   Tobacco Use   • Smoking status: Never Smoker   • Smokeless tobacco: Never Used   Substance and Sexual Activity   • Alcohol use: Yes     Comment: occasionally   • Drug use: No   • Sexual activity: Yes     Partners: Male   Other Topics Concern   • Not on file   Social History Narrative    ** Merged History Encounter **          Social Determinants of Health     Financial Resource Strain:    • Difficulty of Paying Living Expenses:    Food Insecurity:    • Worried About Running Out of Food in the Last Year:    • Ran Out of Food in the Last Year:    Transportation Needs:    • Lack of Transportation (Medical):    • Lack of Transportation (Non-Medical):    Physical Activity:    • Days of Exercise per Week:    • Minutes of Exercise per Session:    Stress:    • Feeling of Stress :    Social Connections:    •  "Frequency of Communication with Friends and Family:    • Frequency of Social Gatherings with Friends and Family:    • Attends Caoda[employer] Services:    • Active Member of Clubs or Organizations:    • Attends Club or Organization Meetings:    • Marital Status:    Intimate Partner Violence:    • Fear of Current or Ex-Partner:    • Emotionally Abused:    • Physically Abused:    • Sexually Abused:         ROS     As per HPI, the rest are negative.       Objective:     /80   Pulse 64   Temp 36.3 °C (97.4 °F) (Temporal)   Ht 1.651 m (5' 5\")   Wt 91.1 kg (200 lb 13.4 oz)   SpO2 97%   BMI 33.42 kg/m²      Physical Exam  Vitals and nursing note reviewed.   Constitutional:       General: She is not in acute distress.     Appearance: She is well-developed. She is not diaphoretic.   HENT:      Head: Normocephalic and atraumatic.      Right Ear: Tympanic membrane and external ear normal.      Left Ear: Tympanic membrane and external ear normal.      Ears:      Comments: Positive pain on movement of the left pinna, no evidence of any swelling, redness of the left ear canal, no evidence of any rash, erythema left external ear, no rash, swelling or redness skin around the external ear     Nose: Nose normal.      Mouth/Throat:      Pharynx: No oropharyngeal exudate.   Eyes:      General: No scleral icterus.        Right eye: No discharge.         Left eye: No discharge.      Conjunctiva/sclera: Conjunctivae normal.      Pupils: Pupils are equal, round, and reactive to light.   Neck:      Thyroid: No thyromegaly.      Vascular: No JVD.      Trachea: No tracheal deviation.   Cardiovascular:      Rate and Rhythm: Normal rate and regular rhythm.      Heart sounds: Normal heart sounds. No murmur. No friction rub. No gallop.    Pulmonary:      Effort: Pulmonary effort is normal. No respiratory distress.      Breath sounds: Normal breath sounds. No stridor. No wheezing or rales.   Chest:      Chest wall: No tenderness. "   Abdominal:      General: Bowel sounds are normal. There is no distension.      Palpations: Abdomen is soft. There is no mass.      Tenderness: There is no abdominal tenderness. There is no guarding or rebound.      Hernia: No hernia is present.   Musculoskeletal:         General: No tenderness or deformity. Normal range of motion.      Cervical back: Normal range of motion and neck supple.      Comments: Neck exam-full range of movement, tender on palpation posterior neck and right and left trapezius, there is slight limitation of movement on turning to the left, positive tenderness on palpation of bilateral trapezius   Lymphadenopathy:      Cervical: No cervical adenopathy.   Skin:     General: Skin is warm and dry.      Coloration: Skin is not pale.      Findings: No erythema or rash.   Neurological:      Mental Status: She is alert and oriented to person, place, and time.      Cranial Nerves: No cranial nerve deficit.      Motor: No abnormal muscle tone.      Coordination: Coordination normal.      Deep Tendon Reflexes: Reflexes are normal and symmetric. Reflexes normal.      Comments: Strong  both hands, DTRs 2+ upper extremities, sensation intact to light touch upper extremities   Psychiatric:         Behavior: Behavior normal.         Thought Content: Thought content normal.         Judgment: Judgment normal.                      Assessment/Plan:        1. Acute neck pain  She has significant pain of the sides of the neck, posterior neck going down the trapezius area and the area around the left external ear without any evidence of infection in the left ear including external otitis or otitis media.  This is most likely musculoskeletal strain probably from her work.  Advised warm compresses 15 minutes 3 times a day.  She will call us with the name of the anti-inflammatories that she is already on for her work-related injury.  Return if there is no improvement or if there is worsening of the problem  especially if she starts having pain going down the arms with numbness and tingling down the arms.  We will do screening labs.- Lipid Profile; Future  - Comp Metabolic Panel; Future  - CBC WITH DIFFERENTIAL; Future    2. Elevated blood pressure reading  Mild elevation of blood pressure with no prior history of hypertension.  This could be related to problem #1.  We will recheck next visit.  We will do screening labs.  - Lipid Profile; Future  - Comp Metabolic Panel; Future  - CBC WITH DIFFERENTIAL; Future    3. Encounter for screening mammogram for malignant neoplasm of breast  Screening mammogram ordered.  - MA-SCREENING MAMMO BILAT W/TOMOSYNTHESIS W/CAD; Future    4. Screening for colon cancer  She was referred to GI for screening colonoscopy.  - REFERRAL TO GI FOR COLONOSCOPY    My total time spent caring for the patient on the day of the encounter was 45 minutes.   This does not include time spent on separately billable procedures/tests.    Return in 1 month for GYN exam/Pap smear.      Please note that this dictation was created using voice recognition software. I have worked with consultants from the vendor as well as technical experts from [facility] to optimize the interface. I have made every reasonable attempt to correct obvious errors, but I expect that there are errors of grammar and possibly content I did not discover before finalizing the note.

## 2021-04-28 LAB
ALBUMIN SERPL-MCNC: 4.3 G/DL (ref 3.8–4.9)
ALBUMIN/GLOB SERPL: 1.6 {RATIO} (ref 1.2–2.2)
ALP SERPL-CCNC: 81 IU/L (ref 39–117)
ALT SERPL-CCNC: 12 IU/L (ref 0–32)
AST SERPL-CCNC: 16 IU/L (ref 0–40)
BASOPHILS # BLD AUTO: 0.1 X10E3/UL (ref 0–0.2)
BASOPHILS NFR BLD AUTO: 2 %
BILIRUB SERPL-MCNC: 0.3 MG/DL (ref 0–1.2)
BUN SERPL-MCNC: 13 MG/DL (ref 6–24)
BUN/CREAT SERPL: 22 (ref 9–23)
CALCIUM SERPL-MCNC: 9.5 MG/DL (ref 8.7–10.2)
CHLORIDE SERPL-SCNC: 107 MMOL/L (ref 96–106)
CHOLEST SERPL-MCNC: 225 MG/DL (ref 100–199)
CO2 SERPL-SCNC: 21 MMOL/L (ref 20–29)
CREAT SERPL-MCNC: 0.58 MG/DL (ref 0.57–1)
EOSINOPHIL # BLD AUTO: 0.3 X10E3/UL (ref 0–0.4)
EOSINOPHIL NFR BLD AUTO: 4 %
ERYTHROCYTE [DISTWIDTH] IN BLOOD BY AUTOMATED COUNT: 13.2 % (ref 11.7–15.4)
GLOBULIN SER CALC-MCNC: 2.7 G/DL (ref 1.5–4.5)
GLUCOSE SERPL-MCNC: 92 MG/DL (ref 65–99)
HCT VFR BLD AUTO: 43.5 % (ref 34–46.6)
HDLC SERPL-MCNC: 49 MG/DL
HGB BLD-MCNC: 14.5 G/DL (ref 11.1–15.9)
IMM GRANULOCYTES # BLD AUTO: 0 X10E3/UL (ref 0–0.1)
IMM GRANULOCYTES NFR BLD AUTO: 0 %
IMMATURE CELLS  115398: NORMAL
LABORATORY COMMENT REPORT: ABNORMAL
LDLC SERPL CALC-MCNC: 131 MG/DL (ref 0–99)
LYMPHOCYTES # BLD AUTO: 1.8 X10E3/UL (ref 0.7–3.1)
LYMPHOCYTES NFR BLD AUTO: 23 %
MCH RBC QN AUTO: 29.6 PG (ref 26.6–33)
MCHC RBC AUTO-ENTMCNC: 33.3 G/DL (ref 31.5–35.7)
MCV RBC AUTO: 89 FL (ref 79–97)
MONOCYTES # BLD AUTO: 0.4 X10E3/UL (ref 0.1–0.9)
MONOCYTES NFR BLD AUTO: 5 %
MORPHOLOGY BLD-IMP: NORMAL
NEUTROPHILS # BLD AUTO: 5.4 X10E3/UL (ref 1.4–7)
NEUTROPHILS NFR BLD AUTO: 66 %
NRBC BLD AUTO-RTO: NORMAL %
PLATELET # BLD AUTO: 393 X10E3/UL (ref 150–450)
POTASSIUM SERPL-SCNC: 3.9 MMOL/L (ref 3.5–5.2)
PROT SERPL-MCNC: 7 G/DL (ref 6–8.5)
RBC # BLD AUTO: 4.9 X10E6/UL (ref 3.77–5.28)
SODIUM SERPL-SCNC: 142 MMOL/L (ref 134–144)
TRIGL SERPL-MCNC: 256 MG/DL (ref 0–149)
VLDLC SERPL CALC-MCNC: 45 MG/DL (ref 5–40)
WBC # BLD AUTO: 8.1 X10E3/UL (ref 3.4–10.8)

## 2021-07-26 ENCOUNTER — OFFICE VISIT (OUTPATIENT)
Dept: MEDICAL GROUP | Facility: PHYSICIAN GROUP | Age: 56
End: 2021-07-26
Payer: COMMERCIAL

## 2021-07-26 VITALS
WEIGHT: 197.31 LBS | OXYGEN SATURATION: 98 % | DIASTOLIC BLOOD PRESSURE: 80 MMHG | TEMPERATURE: 97.1 F | SYSTOLIC BLOOD PRESSURE: 150 MMHG | HEIGHT: 65 IN | BODY MASS INDEX: 32.87 KG/M2 | HEART RATE: 65 BPM

## 2021-07-26 DIAGNOSIS — R23.3 EASY BRUISABILITY: ICD-10-CM

## 2021-07-26 DIAGNOSIS — G44.329 CHRONIC POST-TRAUMATIC HEADACHE, NOT INTRACTABLE: ICD-10-CM

## 2021-07-26 DIAGNOSIS — I10 ESSENTIAL HYPERTENSION: ICD-10-CM

## 2021-07-26 DIAGNOSIS — E78.5 DYSLIPIDEMIA: ICD-10-CM

## 2021-07-26 PROCEDURE — 99214 OFFICE O/P EST MOD 30 MIN: CPT | Performed by: FAMILY MEDICINE

## 2021-07-26 RX ORDER — AMLODIPINE BESYLATE 5 MG/1
5 TABLET ORAL DAILY
Qty: 30 TABLET | Refills: 1 | Status: SHIPPED | OUTPATIENT
Start: 2021-07-26 | End: 2021-08-23 | Stop reason: SDUPTHER

## 2021-07-26 ASSESSMENT — FIBROSIS 4 INDEX: FIB4 SCORE: 0.62

## 2021-07-26 NOTE — LETTER
July 26, 2021        Gabriela Prince    Was seen in our office on July 26, 2021.  If you have any questions please feel free to contact me at the above telephone number              Shira Husain MD.

## 2021-07-27 NOTE — PROGRESS NOTES
Subjective:      Gabriela Prince is a 55 y.o. female who presents with Results            HPI     Patient returns for follow-up.  She is accompanied by her daughter who helps translate for her.    I saw her 3 months ago for acute neck pain and elevated blood pressure reading.  I thought the neck pain was probably neck strain from her work.  She said this is improved compared to before.  She has not been monitoring her blood pressure at home.  On recheck her blood pressure was 150/80.    She dated that she had a work-related injury in May 2019.  She tripped on a floor mat and hit the right forehead on concrete.  There was no loss of consciousness.  Since then she has been having headaches mainly on the right side most of the time but sometimes the whole head.  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  She also had MRI of the cervical spine that showed no evidence of acute abnormality, no central canal stenosis or neural foraminal stenosis.  She was seen and evaluated by Dr. Sanchez, neurologist and was diagnosed with postconcussive syndrome chronic intractable headache.  She was tried on nortriptyline but this caused daytime dizziness.  She was tried on Imitrex but this also caused dizziness.  She was also tried on rizatriptan but this caused nausea and dizziness.  She was put on Topamax but she could not supply if she took it or not.  The headache is ongoing happening about 3 times a week lasting for the episode.  She takes over-the-counter anti-inflammatories without help.    She also had blood work done in April 2021 came back elevated cholesterol panel.  Advised her to manage this with her own efforts including diet, exercise and weight loss.    She said for the last 1 month she has noticed bruising on her upper and lower extremities extremities trauma.  Her CBC done in April 2021 came back all  "within normal limits with normal platelet count.  She states that she was diagnosed with blood cancer in 2004 and she could not specify if it was lymphoma or leukemia.  She said she was treated by the hematologist oncologist here in [place] but she could not specify the name of the doctor.  She said she became cancer free.  She is worried that her cancer may be back.  Denies any spontaneous bleeding from the gums or nose.     Past medical history, past surgical history, family history reviewed-no changes    Social history reviewed-no changes    Allergies reviewed-no changes    Medications reviewed-no changes    ROS     As per HPI, the rest are negative.       Objective:     /80 (BP Location: Left arm, Patient Position: Sitting, BP Cuff Size: Adult)   Pulse 65   Temp 36.2 °C (97.1 °F) (Temporal)   Ht 1.651 m (5' 5\")   Wt 89.5 kg (197 lb 5 oz)   SpO2 98%   BMI 32.83 kg/m²      Physical Exam     Examined alert, awake, oriented, not in distress    Neck-supple, no lymphadenopathy, no thyromegaly  Lungs-clear to auscultation, no rales, no wheezes  Heart-regular rate and rhythm, no murmur  Extremities-no edema, clubbing, cyanosis  Neuro exam-neurologically intact  Skin-she has fading multiple small ecchymoses on the upper extremities, she does not have any ecchymoses on the lower extremities, she does have prominent varicose veins and spider veins/capillaries            Orders Only on 04/27/2021   Component Date Value Ref Range Status   • WBC 04/27/2021 8.1  3.4 - 10.8 x10E3/uL Final   • RBC 04/27/2021 4.90  3.77 - 5.28 x10E6/uL Final   • Hemoglobin 04/27/2021 14.5  11.1 - 15.9 g/dL Final   • Hematocrit 04/27/2021 43.5  34.0 - 46.6 % Final   • MCV 04/27/2021 89  79 - 97 fL Final   • MCH 04/27/2021 29.6  26.6 - 33.0 pg Final   • MCHC 04/27/2021 33.3  31.5 - 35.7 g/dL Final   • RDW 04/27/2021 13.2  11.7 - 15.4 % Final   • Platelet Count 04/27/2021 393  150 - 450 x10E3/uL Final   • Neutrophils-Polys 04/27/2021 66  " Not Estab. % Final   • Lymphocytes 04/27/2021 23  Not Estab. % Final   • Monocytes 04/27/2021 5  Not Estab. % Final   • Eosinophils 04/27/2021 4  Not Estab. % Final   • Basophils 04/27/2021 2  Not Estab. % Final   • Immature Cells 04/27/2021 CANCELED   Final    Result canceled by the ancillary.   • Neutrophils (Absolute) 04/27/2021 5.4  1.4 - 7.0 x10E3/uL Final   • Lymphs (Absolute) 04/27/2021 1.8  0.7 - 3.1 x10E3/uL Final   • Monos (Absolute) 04/27/2021 0.4  0.1 - 0.9 x10E3/uL Final   • Eos (Absolute) 04/27/2021 0.3  0.0 - 0.4 x10E3/uL Final   • Baso (Absolute) 04/27/2021 0.1  0.0 - 0.2 x10E3/uL Final   • Immature Granulocytes 04/27/2021 0  Not Estab. % Final   • Immature Granulocytes (abs) 04/27/2021 0.0  0.0 - 0.1 x10E3/uL Final   • Nucleated RBC 04/27/2021 CANCELED   Final    Result canceled by the ancillary.   • Comments-Diff 04/27/2021 CANCELED   Final    Result canceled by the ancillary.   • Glucose 04/27/2021 92  65 - 99 mg/dL Final   • Bun 04/27/2021 13  6 - 24 mg/dL Final   • Creatinine 04/27/2021 0.58  0.57 - 1.00 mg/dL Final   • GFR If Non  04/27/2021 106  >59 mL/min/1.73 Final   • GFR If  04/27/2021 122  >59 mL/min/1.73 Final    Comment: **[facility] currently reports eGFR in compliance with the current**  recommendations of the [employer]. [facility] will  update reporting as new guidelines are published from the NKF-ASN  Task force.     • Bun-Creatinine Ratio 04/27/2021 22  9 - 23 Final   • Sodium 04/27/2021 142  134 - 144 mmol/L Final   • Potassium 04/27/2021 3.9  3.5 - 5.2 mmol/L Final   • Chloride 04/27/2021 107* 96 - 106 mmol/L Final   • Co2 04/27/2021 21  20 - 29 mmol/L Final   • Calcium 04/27/2021 9.5  8.7 - 10.2 mg/dL Final   • Total Protein 04/27/2021 7.0  6.0 - 8.5 g/dL Final   • Albumin 04/27/2021 4.3  3.8 - 4.9 g/dL Final   • Globulin 04/27/2021 2.7  1.5 - 4.5 g/dL Final   • A-G Ratio 04/27/2021 1.6  1.2 - 2.2 Final   • Total Bilirubin 04/27/2021  0.3  0.0 - 1.2 mg/dL Final   • Alkaline Phosphatase 04/27/2021 81  39 - 117 IU/L Final   • AST(SGOT) 04/27/2021 16  0 - 40 IU/L Final   • ALT(SGPT) 04/27/2021 12  0 - 32 IU/L Final   • Cholesterol,Tot 04/27/2021 225* 100 - 199 mg/dL Final   • Triglycerides 04/27/2021 256* 0 - 149 mg/dL Final   • HDL 04/27/2021 49  >39 mg/dL Final   • VLDL Cholesterol Calc 04/27/2021 45* 5 - 40 mg/dL Final   • LDL Chol Calc (NIH) 04/27/2021 131* 0 - 99 mg/dL Final   • Comment: 04/27/2021 CANCELED   Final    Result canceled by the ancillary.         The 10-year ASCVD risk score (Barby BLANCAS Jr., et al., 2013) is: 2.2%     Assessment/Plan:        1. Chronic post-traumatic headache, not intractable  Patient has ongoing headache since her head injury in May 2019 as mentioned above.  She has been seen and evaluated by the neurologist last year and has tried on medications to manage the headache.  She had side effects from the medications tried.  It was unclear if she took the Topamax or not.  This was a work-related head injury but she stated that the case was closed in terms of workers comp case.  [facility] will [facility] for further evaluation and management of the chronic headache.    2. Easy bruisability  I will repeat CBC to further evaluate for the bruisability.  I will include PT and PTT.  She did say she had diagnosis of either leukemia or lymphoma in 2004 and was treated with complete response.  I told the daughter to try to find a record of her cancer and the doctor who treated her so we can get old records.  I did reassure her of the daughter that her most recent CBC in April 2021 came back within normal limits and did not point to any potential for EMEA or lymphoma.    3. Dyslipidemia  Her 10-year ASCVD risk score is 2.2% therefore she will manage this with her own efforts.  Discussed diet, exercise and weight loss.    4. Essential hypertension  She has ongoing elevation of her blood pressure.  She has  associated posterior neck pain may be from the elevation of blood pressure.  I will start her on blood pressure medication which is amlodipine because of systolic hypertension.  Made aware of potential side effects.  Monitor blood pressure at home and keep a record.  Advised to get blood pressure machine.  She can bring the machine on her follow-up as well.  Advised avoidance of salty foods, regular exercises and weight loss.    Follow-up in 1 month to check the blood pressure.      Please note that this dictation was created using voice recognition software. I have worked with consultants from the vendor as well as technical experts from [employer] to optimize the interface. I have made every reasonable attempt to correct obvious errors, but I expect that there are errors of grammar and possibly content I did not discover before finalizing the note.

## 2021-07-27 NOTE — PATIENT INSTRUCTIONS
Blood work came back no anemia.  Fasting blood glucose is normal.  No diabetes.  Liver and kidney functions are normal.  Total cholesterol is high at 225.  The goal is below 200.  LDL or bad cholesterol is high at 131.  The goal is below 100.  To get this improved, avoid fatty foods, eat more fruits and vegetables, try to have fish 3 times a week, exercise regularly and lose weight.  Triglycerides are high at 256.  They need to be below 150.  Triglycerides are from carbohydrates and sweets so avoid the sweets and decrease the carbohydrates.  Keep appointment as scheduled on May 10.  I can go over more in detail her lab results at that time.     Shira Husain M.D.

## 2021-07-28 ENCOUNTER — HOSPITAL ENCOUNTER (OUTPATIENT)
Dept: RADIOLOGY | Facility: MEDICAL CENTER | Age: 56
End: 2021-07-28
Payer: COMMERCIAL

## 2021-08-02 ENCOUNTER — HOSPITAL ENCOUNTER (OUTPATIENT)
Dept: LAB | Facility: MEDICAL CENTER | Age: 56
End: 2021-08-02
Attending: FAMILY MEDICINE
Payer: COMMERCIAL

## 2021-08-02 DIAGNOSIS — M54.2 ACUTE NECK PAIN: ICD-10-CM

## 2021-08-02 DIAGNOSIS — R23.3 EASY BRUISABILITY: ICD-10-CM

## 2021-08-02 DIAGNOSIS — R03.0 ELEVATED BLOOD PRESSURE READING: ICD-10-CM

## 2021-08-02 LAB
ALBUMIN SERPL BCP-MCNC: 4.2 G/DL (ref 3.2–4.9)
ALBUMIN/GLOB SERPL: 1.6 G/DL
ALP SERPL-CCNC: 79 U/L (ref 30–99)
ALT SERPL-CCNC: 12 U/L (ref 2–50)
ANION GAP SERPL CALC-SCNC: 11 MMOL/L (ref 7–16)
APTT PPP: 26.3 SEC (ref 24.7–36)
AST SERPL-CCNC: 13 U/L (ref 12–45)
BASOPHILS # BLD AUTO: 1 % (ref 0–1.8)
BASOPHILS # BLD: 0.1 K/UL (ref 0–0.12)
BILIRUB SERPL-MCNC: <0.2 MG/DL (ref 0.1–1.5)
BUN SERPL-MCNC: 21 MG/DL (ref 8–22)
CALCIUM SERPL-MCNC: 9.1 MG/DL (ref 8.5–10.5)
CHLORIDE SERPL-SCNC: 108 MMOL/L (ref 96–112)
CHOLEST SERPL-MCNC: 195 MG/DL (ref 100–199)
CO2 SERPL-SCNC: 23 MMOL/L (ref 20–33)
CREAT SERPL-MCNC: 0.72 MG/DL (ref 0.5–1.4)
EOSINOPHIL # BLD AUTO: 0.35 K/UL (ref 0–0.51)
EOSINOPHIL NFR BLD: 3.5 % (ref 0–6.9)
ERYTHROCYTE [DISTWIDTH] IN BLOOD BY AUTOMATED COUNT: 43.9 FL (ref 35.9–50)
GLOBULIN SER CALC-MCNC: 2.7 G/DL (ref 1.9–3.5)
GLUCOSE SERPL-MCNC: 99 MG/DL (ref 65–99)
HCT VFR BLD AUTO: 40.5 % (ref 37–47)
HDLC SERPL-MCNC: 49 MG/DL
HGB BLD-MCNC: 13.1 G/DL (ref 12–16)
IMM GRANULOCYTES # BLD AUTO: 0.03 K/UL (ref 0–0.11)
IMM GRANULOCYTES NFR BLD AUTO: 0.3 % (ref 0–0.9)
INR PPP: 0.97 (ref 0.87–1.13)
LDLC SERPL CALC-MCNC: 108 MG/DL
LYMPHOCYTES # BLD AUTO: 2.84 K/UL (ref 1–4.8)
LYMPHOCYTES NFR BLD: 28.3 % (ref 22–41)
MCH RBC QN AUTO: 28.6 PG (ref 27–33)
MCHC RBC AUTO-ENTMCNC: 32.3 G/DL (ref 33.6–35)
MCV RBC AUTO: 88.4 FL (ref 81.4–97.8)
MONOCYTES # BLD AUTO: 0.74 K/UL (ref 0–0.85)
MONOCYTES NFR BLD AUTO: 7.4 % (ref 0–13.4)
NEUTROPHILS # BLD AUTO: 5.96 K/UL (ref 2–7.15)
NEUTROPHILS NFR BLD: 59.5 % (ref 44–72)
NRBC # BLD AUTO: 0 K/UL
NRBC BLD-RTO: 0 /100 WBC
PLATELET # BLD AUTO: 307 K/UL (ref 164–446)
PMV BLD AUTO: 11 FL (ref 9–12.9)
POTASSIUM SERPL-SCNC: 3.8 MMOL/L (ref 3.6–5.5)
PROT SERPL-MCNC: 6.9 G/DL (ref 6–8.2)
PROTHROMBIN TIME: 12.6 SEC (ref 12–14.6)
RBC # BLD AUTO: 4.58 M/UL (ref 4.2–5.4)
SODIUM SERPL-SCNC: 142 MMOL/L (ref 135–145)
TRIGL SERPL-MCNC: 188 MG/DL (ref 0–149)
WBC # BLD AUTO: 10 K/UL (ref 4.8–10.8)

## 2021-08-02 PROCEDURE — 80053 COMPREHEN METABOLIC PANEL: CPT

## 2021-08-02 PROCEDURE — 36415 COLL VENOUS BLD VENIPUNCTURE: CPT

## 2021-08-02 PROCEDURE — 80061 LIPID PANEL: CPT

## 2021-08-02 PROCEDURE — 85610 PROTHROMBIN TIME: CPT

## 2021-08-02 PROCEDURE — 85730 THROMBOPLASTIN TIME PARTIAL: CPT

## 2021-08-02 PROCEDURE — 85025 COMPLETE CBC W/AUTO DIFF WBC: CPT

## 2021-08-03 ENCOUNTER — TELEPHONE (OUTPATIENT)
Dept: MEDICAL GROUP | Facility: PHYSICIAN GROUP | Age: 56
End: 2021-08-03

## 2021-08-03 NOTE — TELEPHONE ENCOUNTER
----- Message from Shira Husain M.D. sent at 8/3/2021  6:40 AM PDT -----  Complete blood count came back no anemia.  Platelet count is normal.  WBC count normal.  No abnormal findings on the complete blood count to indicate leukemia or lymphoma.  Blood work to check for any bleeding disorder also came back normal.  Cholesterol panel came back the triglycerides decreased from 256-188.  The goal is below 150 therefore this is closer to goal.  Continue to avoid the sweets and decrease the carbs to improve the triglycerides.  The LDL cholesterol improved and decreased from 131-108.  The goal is below 100 therefore this is almost at goal.  Continue avoidance of fatty foods.  Eat more fruits and vegetables, try to have fish 3 times a week, exercise regularly and keep a healthy weight.  Keep your appointment as scheduled with me later this month.

## 2021-08-03 NOTE — TELEPHONE ENCOUNTER
Phone Number Called: [phone] (home)     Call outcome: Did not leave a detailed message. Requested patient to call back.    Message: lvm for patient to call regarding results.

## 2021-08-16 ENCOUNTER — HOSPITAL ENCOUNTER (OUTPATIENT)
Dept: RADIOLOGY | Facility: MEDICAL CENTER | Age: 56
End: 2021-08-16
Attending: FAMILY MEDICINE
Payer: COMMERCIAL

## 2021-08-16 DIAGNOSIS — Z12.31 ENCOUNTER FOR SCREENING MAMMOGRAM FOR MALIGNANT NEOPLASM OF BREAST: ICD-10-CM

## 2021-08-16 PROCEDURE — 77063 BREAST TOMOSYNTHESIS BI: CPT

## 2021-08-18 ENCOUNTER — TELEPHONE (OUTPATIENT)
Dept: MEDICAL GROUP | Facility: PHYSICIAN GROUP | Age: 56
End: 2021-08-18

## 2021-08-18 NOTE — TELEPHONE ENCOUNTER
----- Message from Shira Husain M.D. sent at 8/17/2021  6:50 PM PDT -----  Annual screening mammogram came back no evidence of malignancy.  Continue yearly screening mammogram.

## 2021-08-18 NOTE — TELEPHONE ENCOUNTER
Phone Number Called: [phone] (home)     Call outcome: With the Assistance of  [facility] ID#[id number]    Message: LVM for Pt to cb for Imaging results.

## 2021-08-19 NOTE — TELEPHONE ENCOUNTER
Phone Number Called: [phone]      Call outcome: Did not leave a detailed message. Requested patient to call back.    Message: Left message for Patient to call back in regards to results.

## 2021-08-20 NOTE — TELEPHONE ENCOUNTER
Phone Number Called: [phone] (home)     Call outcome: Did not leave a detailed message. Requested patient to call back.    Message: 3rd attempt patient has upcoming appt

## 2021-08-23 ENCOUNTER — HOSPITAL ENCOUNTER (OUTPATIENT)
Facility: MEDICAL CENTER | Age: 56
End: 2021-08-23
Attending: FAMILY MEDICINE
Payer: COMMERCIAL

## 2021-08-23 ENCOUNTER — OFFICE VISIT (OUTPATIENT)
Dept: MEDICAL GROUP | Facility: PHYSICIAN GROUP | Age: 56
End: 2021-08-23
Payer: COMMERCIAL

## 2021-08-23 VITALS
WEIGHT: 194.67 LBS | OXYGEN SATURATION: 97 % | HEIGHT: 65 IN | BODY MASS INDEX: 32.43 KG/M2 | HEART RATE: 64 BPM | TEMPERATURE: 97.6 F | SYSTOLIC BLOOD PRESSURE: 130 MMHG | DIASTOLIC BLOOD PRESSURE: 70 MMHG

## 2021-08-23 DIAGNOSIS — H91.93 DECREASED HEARING OF BOTH EARS: ICD-10-CM

## 2021-08-23 DIAGNOSIS — Z01.419 ENCOUNTER FOR ROUTINE GYNECOLOGICAL EXAMINATION WITH PAPANICOLAOU SMEAR OF CERVIX: ICD-10-CM

## 2021-08-23 DIAGNOSIS — Z11.51 SCREENING FOR HUMAN PAPILLOMAVIRUS (HPV): ICD-10-CM

## 2021-08-23 DIAGNOSIS — E78.5 DYSLIPIDEMIA: ICD-10-CM

## 2021-08-23 DIAGNOSIS — I10 ESSENTIAL HYPERTENSION: ICD-10-CM

## 2021-08-23 DIAGNOSIS — R07.81 RIB PAIN ON LEFT SIDE: ICD-10-CM

## 2021-08-23 DIAGNOSIS — R23.3 EASY BRUISABILITY: ICD-10-CM

## 2021-08-23 PROCEDURE — 88175 CYTOPATH C/V AUTO FLUID REDO: CPT

## 2021-08-23 PROCEDURE — 99396 PREV VISIT EST AGE 40-64: CPT | Performed by: FAMILY MEDICINE

## 2021-08-23 PROCEDURE — 99000 SPECIMEN HANDLING OFFICE-LAB: CPT | Performed by: FAMILY MEDICINE

## 2021-08-23 PROCEDURE — 87624 HPV HI-RISK TYP POOLED RSLT: CPT

## 2021-08-23 RX ORDER — AMLODIPINE BESYLATE 5 MG/1
5 TABLET ORAL DAILY
Qty: 90 TABLET | Refills: 1 | Status: SHIPPED | OUTPATIENT
Start: 2021-08-23

## 2021-08-23 ASSESSMENT — FIBROSIS 4 INDEX: FIB4 SCORE: 0.66

## 2021-08-23 NOTE — PROGRESS NOTES
Subjective     Gabriela Prince is a 56 y.o. female who presents with Gynecologic Exam (PAP and GYN)            HPI     Patient is here for routine GYN exam/Pap smear.  She is accompanied by her daughter.  The last GYN exam/Pap smear was normal and she said it was done by Dr. Alaniz with [facility].  No history of abnormal Pap smears.  No history of STDs.  Patient is  5 para 5.  She is already postmenopausal.  Her last mammogram was just done on 2021 that came back no evidence of malignancy.  I have already referred her for screening colonoscopy and her appointment is in 2021.  She is due for Shingrix but we are out of the vaccine so we will return another time to get this done.  Up-to-date with all other immunizations including Tdap, 2 doses of COVID-19 vaccination.  She will be due for the flu shot this coming .    I started her on amlodipine to manage hypertension a month ago.  She does not have a blood pressure machine and so she has not been monitoring her blood pressure at home.  Her blood pressure is now down to goal.  Denies any side effects.  She has noticed that the headache that she has had has also improved although not 100% gone.  She has not set up an appointment yet with the [facility] for evaluation of chronic posttraumatic headache.  She will check with insurance first how much her co-pay will be.    We sent her for PT and PTT because of easy bruisability.  All of these came back within normal limits.  Her CBC came back normal platelet count.    She also did follow-up blood work to check her dyslipidemia.  She is managing this with her own efforts.    She has been having decreased hearing for about a year now.  The daughter said that they have been talking to her loudly because she cannot hear very well.  She wants to be referred for hearing test.    She also complains of left anterior rib cage pain for a few days.  Denies any trauma to the  area.  She has a physical work working at a [employer].  Denies any cough, shortness of breath.    I reviewed the following    Past Medical History:   Diagnosis Date   • Cancer (HCC)     hodgkins lymphoma till    • Hodgkin's disease    • Sleep apnea 2011    does not use cpap now   • Snoring    • Unspecified disorder of thyroid         Past Surgical History:   Procedure Laterality Date   • SHOULDER ARTHROSCOPY Right     SAQIB   • PELVISCOPY  3/7/2011    Performed by MADELINE HOUSTON at SURGERY SAME DAY [facility] ORS   • SALPINGECTOMY  3/7/2011    Performed by MADELINE HOUSTON at SURGERY SAME DAY [facility] ORS   • PB  DELIVERY ONLY     • PRIMARY C SECTION      x1   • TUBAL LIGATION         No Known Allergies    Current Outpatient Medications   Medication Sig Dispense Refill   • amLODIPine (NORVASC) 5 MG Tab Take 1 Tablet by mouth every day. 90 Tablet 1   • naproxen (NAPROSYN) 500 MG Tab TAKE 1 TABLET BY MOUTH TWICE DAILY AFTER MEALS AS NEEDED     • cyclobenzaprine (FLEXERIL) 5 mg tablet Take 5 mg by mouth. (Patient not taking: Reported on 2021)     • diclofenac sodium 1 % Gel APPLY TO THE AFFECTED AREA FOUR TIMES DAILY AS NEEDED FOR PAIN (Patient not taking: Reported on 2021)     • methylPREDNISolone (MEDROL DOSEPAK) 4 MG Tablet Therapy Pack FOLLOW PACKAGE DIRECTIONS (Patient not taking: Reported on 2021)     • NAPROXEN PO Take  by mouth. (Patient not taking: Reported on 2021)     • OMEPRAZOLE PO Take 1 Tab by mouth every day. (Patient not taking: Reported on 2021)       No current facility-administered medications for this visit.        Family History   Problem Relation Age of Onset   • Diabetes Mother    • Heart Disease Father         MI   • Alcohol abuse Father    • No Known Problems Sister    • No Known Problems Brother    • Cancer Neg Hx    • Stroke Neg Hx        Social History     Socioeconomic History   • Marital status:      Spouse name: Not on file   • Number  "of children: 5   • Years of education: Not on file   • Highest education level: Not on file   Occupational History   • Occupation: warehouse   Tobacco Use   • Smoking status: Never Smoker   • Smokeless tobacco: Never Used   Vaping Use   • Vaping Use: Never used   Substance and Sexual Activity   • Alcohol use: Yes     Comment: occasionally   • Drug use: No   • Sexual activity: Yes     Partners: Male   Other Topics Concern   • Not on file   Social History Narrative    ** Merged History Encounter **          Social Determinants of Health     Financial Resource Strain:    • Difficulty of Paying Living Expenses:    Food Insecurity:    • Worried About Running Out of Food in the Last Year:    • Ran Out of Food in the Last Year:    Transportation Needs:    • Lack of Transportation (Medical):    • Lack of Transportation (Non-Medical):    Physical Activity:    • Days of Exercise per Week:    • Minutes of Exercise per Session:    Stress:    • Feeling of Stress :    Social Connections:    • Frequency of Communication with Friends and Family:    • Frequency of Social Gatherings with Friends and Family:    • Attends [employer] Services:    • Active Member of Clubs or Organizations:    • Attends Club or Organization Meetings:    • Marital Status:    Intimate Partner Violence:    • Fear of Current or Ex-Partner:    • Emotionally Abused:    • Physically Abused:    • Sexually Abused:         ROS     As per HPI, the rest are negative.         Objective     /70 (BP Location: Left arm, Patient Position: Sitting, BP Cuff Size: Adult)   Pulse 64   Temp 36.4 °C (97.6 °F) (Temporal)   Ht 1.651 m (5' 5\")   Wt 88.3 kg (194 lb 10.7 oz)   SpO2 97%   BMI 32.39 kg/m²      Physical Exam  Constitutional:       General: She is not in acute distress.     Appearance: She is well-developed. She is not diaphoretic.   HENT:      Head: Normocephalic and atraumatic.      Right Ear: External ear normal.      Left Ear: External ear normal.      " Nose: Nose normal.      Mouth/Throat:      Pharynx: No oropharyngeal exudate.   Eyes:      General: No scleral icterus.        Right eye: No discharge.         Left eye: No discharge.      Conjunctiva/sclera: Conjunctivae normal.      Pupils: Pupils are equal, round, and reactive to light.   Neck:      Thyroid: No thyromegaly.      Trachea: No tracheal deviation.   Cardiovascular:      Rate and Rhythm: Normal rate and regular rhythm.      Heart sounds: Normal heart sounds. No murmur heard.   No gallop.    Pulmonary:      Effort: Pulmonary effort is normal. No respiratory distress.      Breath sounds: Normal breath sounds. No stridor. No wheezing or rales.   Chest:      Breasts: Breasts are symmetrical.         Right: No inverted nipple, mass, nipple discharge, skin change or tenderness.         Left: No inverted nipple, mass, nipple discharge, skin change or tenderness.       Abdominal:      General: Bowel sounds are normal. There is no distension.      Palpations: Abdomen is soft. There is no mass.      Tenderness: There is no abdominal tenderness. There is no guarding or rebound.      Hernia: There is no hernia in the left inguinal area or right inguinal area.   Genitourinary:     General: Normal vulva.      Exam position: Supine.      Labia:         Right: No rash.         Left: No rash.       Vagina: Normal. No vaginal discharge.      Cervix: No cervical motion tenderness, discharge or friability.      Uterus: Not deviated, not enlarged, not fixed and not tender.       Adnexa:         Right: No mass, tenderness or fullness.          Left: No mass, tenderness or fullness.        Rectum: Guaiac result negative. No mass, tenderness, anal fissure, external hemorrhoid or internal hemorrhoid. Normal anal tone.   Musculoskeletal:         General: No tenderness. Normal range of motion.      Cervical back: Normal range of motion and neck supple.   Lymphadenopathy:      Cervical: No cervical adenopathy.      Lower Body:  No right inguinal adenopathy. No left inguinal adenopathy.   Skin:     General: Skin is warm.      Coloration: Skin is not pale.      Findings: No erythema or rash.   Neurological:      Mental Status: She is alert and oriented to person, place, and time.      Cranial Nerves: No cranial nerve deficit.      Motor: No abnormal muscle tone.      Coordination: Coordination normal.      Deep Tendon Reflexes: Reflexes normal.   Psychiatric:         Behavior: Behavior normal.         Thought Content: Thought content normal.        Hospital Outpatient Visit on 08/02/2021   Component Date Value Ref Range Status   • APTT 08/02/2021 26.3  24.7 - 36.0 sec Final   • PT 08/02/2021 12.6  12.0 - 14.6 sec Final   • INR 08/02/2021 0.97  0.87 - 1.13 Final    Comment: INR - Non-therapeutic Reference Range: 0.87-1.13  INR - Therapeutic Reference Range: 2.0-4.0     • WBC 08/02/2021 10.0  4.8 - 10.8 K/uL Final   • RBC 08/02/2021 4.58  4.20 - 5.40 M/uL Final   • Hemoglobin 08/02/2021 13.1  12.0 - 16.0 g/dL Final   • Hematocrit 08/02/2021 40.5  37.0 - 47.0 % Final   • MCV 08/02/2021 88.4  81.4 - 97.8 fL Final   • MCH 08/02/2021 28.6  27.0 - 33.0 pg Final   • MCHC 08/02/2021 32.3* 33.6 - 35.0 g/dL Final   • RDW 08/02/2021 43.9  35.9 - 50.0 fL Final   • Platelet Count 08/02/2021 307  164 - 446 K/uL Final   • MPV 08/02/2021 11.0  9.0 - 12.9 fL Final   • Neutrophils-Polys 08/02/2021 59.50  44.00 - 72.00 % Final   • Lymphocytes 08/02/2021 28.30  22.00 - 41.00 % Final   • Monocytes 08/02/2021 7.40  0.00 - 13.40 % Final   • Eosinophils 08/02/2021 3.50  0.00 - 6.90 % Final   • Basophils 08/02/2021 1.00  0.00 - 1.80 % Final   • Immature Granulocytes 08/02/2021 0.30  0.00 - 0.90 % Final   • Nucleated RBC 08/02/2021 0.00  /100 WBC Final   • Neutrophils (Absolute) 08/02/2021 5.96  2.00 - 7.15 K/uL Final    Includes immature neutrophils, if present.   • Lymphs (Absolute) 08/02/2021 2.84  1.00 - 4.80 K/uL Final   • Monos (Absolute) 08/02/2021 0.74  0.00 -  0.85 K/uL Final   • Eos (Absolute) 08/02/2021 0.35  0.00 - 0.51 K/uL Final   • Baso (Absolute) 08/02/2021 0.10  0.00 - 0.12 K/uL Final   • Immature Granulocytes (abs) 08/02/2021 0.03  0.00 - 0.11 K/uL Final   • NRBC (Absolute) 08/02/2021 0.00  K/uL Final   • Sodium 08/02/2021 142  135 - 145 mmol/L Final   • Potassium 08/02/2021 3.8  3.6 - 5.5 mmol/L Final   • Chloride 08/02/2021 108  96 - 112 mmol/L Final   • Co2 08/02/2021 23  20 - 33 mmol/L Final   • Anion Gap 08/02/2021 11.0  7.0 - 16.0 Final   • Glucose 08/02/2021 99  65 - 99 mg/dL Final   • Bun 08/02/2021 21  8 - 22 mg/dL Final   • Creatinine 08/02/2021 0.72  0.50 - 1.40 mg/dL Final   • Calcium 08/02/2021 9.1  8.5 - 10.5 mg/dL Final   • AST(SGOT) 08/02/2021 13  12 - 45 U/L Final   • ALT(SGPT) 08/02/2021 12  2 - 50 U/L Final   • Alkaline Phosphatase 08/02/2021 79  30 - 99 U/L Final   • Total Bilirubin 08/02/2021 <0.2  0.1 - 1.5 mg/dL Final   • Albumin 08/02/2021 4.2  3.2 - 4.9 g/dL Final   • Total Protein 08/02/2021 6.9  6.0 - 8.2 g/dL Final   • Globulin 08/02/2021 2.7  1.9 - 3.5 g/dL Final   • A-G Ratio 08/02/2021 1.6  g/dL Final   • Cholesterol,Tot 08/02/2021 195  100 - 199 mg/dL Final   • Triglycerides 08/02/2021 188* 0 - 149 mg/dL Final   • HDL 08/02/2021 49  >=40 mg/dL Final   • LDL 08/02/2021 108* <100 mg/dL Final   • GFR If  08/02/2021 >60  >60 mL/min/1.73 m 2 Final   • GFR If Non  08/02/2021 >60  >60 mL/min/1.73 m 2 Final                  The 10-year ASCVD risk score (Barbytomás BLANCAS Jr., et al., 2013) is: 2%             Assessment & Plan        1. Encounter for routine gynecological examination with Papanicolaou smear of cervix  Complete exam was done.  Pap smear was done.  Specimen was packaged and sent to the lab.  Up-to-date with screening mammogram.  She is already scheduled for screening colonoscopy in October 2021.  Advised to get flu shot in the fall.  We are out of Shingrix so we will give it when available.  Daughter  will message us through [employer] later this week to check for availability and will come for MA visit if available.  - THINPREP PAP WITH HPV; Future    2. Screening for human papillomavirus (HPV)  Screening HPV was done.  - THINPREP PAP WITH HPV; Future    3. Essential hypertension  This is now under acceptable control on amlodipine without side effects.  Continue medication.  Advised to stay on this medication as her maintenance medicine.  She asked if this can be discontinued later on and I told her only if the blood pressure runs below 90 mmHg.  - amLODIPine (NORVASC) 5 MG Tab; Take 1 Tablet by mouth every day.  Dispense: 90 Tablet; Refill: 1    4. Dyslipidemia  Cholesterol panel came back LDL improved from 131-108.  The goal is below 100 therefore this is closer to goal.  The triglycerides also improved and decreased from 256-188 which is also close to goal.  HDL is at goal.  10-year ASCVD risk score is low at 2%.  She will continue to manage this with her own efforts.    5. Easy bruisability  Reassured her PT and PTT came back within normal range therefore no evidence of bleeding disorder.  Platelet count is normal.    6. Decreased hearing of both ears  Normal exam of the tympanic membranes.  Referral placed to audiology for hearing test.  - REFERRAL TO AUDIOLOGY    7. Rib pain on left side  No trauma.  Patient without any associated symptoms of this cough, shortness of breath.  Room air pulse ox within normal range.  Tender on palpation anterior lower rib cage.  This could be due to musculoskeletal problem.  Advised warm compresses for 15 minutes 3 times a day.  May take Tylenol as needed for pain.  If there is no improvement or if there is worsening of the problem in the next 2 weeks she will return for reevaluation.

## 2021-08-23 NOTE — LETTER
August 23, 2021         Patient: Gabriela Prince   YOB: 1965   Date of Visit: 8/23/2021           To Whom it May Concern:    Gabriela Prince was seen in my clinic on 8/23/2021.    If you have any questions or concerns, please don't hesitate to call.        Sincerely,           Shira Husain M.D.  Electronically Signed

## 2021-08-24 DIAGNOSIS — Z01.419 ENCOUNTER FOR ROUTINE GYNECOLOGICAL EXAMINATION WITH PAPANICOLAOU SMEAR OF CERVIX: ICD-10-CM

## 2021-08-24 DIAGNOSIS — Z11.51 SCREENING FOR HUMAN PAPILLOMAVIRUS (HPV): ICD-10-CM

## 2021-08-24 LAB
CYTOLOGY REG CYTOL: NORMAL
HPV HR 12 DNA CVX QL NAA+PROBE: NEGATIVE
HPV16 DNA SPEC QL NAA+PROBE: NEGATIVE
HPV18 DNA SPEC QL NAA+PROBE: NEGATIVE
SPECIMEN SOURCE: NORMAL

## 2021-12-09 ENCOUNTER — HOSPITAL ENCOUNTER (EMERGENCY)
Facility: MEDICAL CENTER | Age: 56
End: 2021-12-09
Attending: EMERGENCY MEDICINE
Payer: COMMERCIAL

## 2021-12-09 ENCOUNTER — APPOINTMENT (OUTPATIENT)
Dept: RADIOLOGY | Facility: MEDICAL CENTER | Age: 56
End: 2021-12-09
Attending: EMERGENCY MEDICINE
Payer: COMMERCIAL

## 2021-12-09 VITALS
OXYGEN SATURATION: 98 % | RESPIRATION RATE: 20 BRPM | HEIGHT: 65 IN | WEIGHT: 187.61 LBS | TEMPERATURE: 97.8 F | BODY MASS INDEX: 31.26 KG/M2 | DIASTOLIC BLOOD PRESSURE: 88 MMHG | HEART RATE: 66 BPM | SYSTOLIC BLOOD PRESSURE: 158 MMHG

## 2021-12-09 DIAGNOSIS — H67.9 OTITIS MEDIA IN DISEASE CLASSIFIED ELSEWHERE, UNSPECIFIED LATERALITY: ICD-10-CM

## 2021-12-09 PROCEDURE — 71045 X-RAY EXAM CHEST 1 VIEW: CPT

## 2021-12-09 PROCEDURE — 99283 EMERGENCY DEPT VISIT LOW MDM: CPT

## 2021-12-09 RX ORDER — AMOXICILLIN AND CLAVULANATE POTASSIUM 875; 125 MG/1; MG/1
1 TABLET, FILM COATED ORAL 2 TIMES DAILY
Qty: 20 TABLET | Refills: 0 | Status: SHIPPED | OUTPATIENT
Start: 2021-12-09

## 2021-12-09 RX ORDER — BENZONATATE 100 MG/1
100 CAPSULE ORAL 3 TIMES DAILY PRN
Qty: 60 CAPSULE | Refills: 0 | Status: SHIPPED | OUTPATIENT
Start: 2021-12-09 | End: 2021-12-13 | Stop reason: SDUPTHER

## 2021-12-09 ASSESSMENT — FIBROSIS 4 INDEX: FIB4 SCORE: 0.66

## 2021-12-09 NOTE — ED PROVIDER NOTES
ED Provider Note    CHIEF COMPLAINT  Chief Complaint   Patient presents with   • Cough     x1 week   • Congestion     x1 week   • Ear Pain     x1 week       HPI  Gabriela Prince is a 56 y.o. female here for evaluation of cough and congestion for a week.  Patient states she also has some ear pain, left greater than right sided.  She has no vomiting or fever chills.  Patient has no chest pain or shortness of breath.  She has no abdominal pain.  States that she does not know she has ill contacts.  Nothing seems to leave exacerbate her symptoms.  She is not taken anything prior to arrival.    ROS;  Please see HPI  O/W negative     PAST MEDICAL HISTORY   has a past medical history of Cancer (HCC), Hodgkin's disease, Sleep apnea (2011), Snoring, and Unspecified disorder of thyroid.    SOCIAL HISTORY  Social History     Tobacco Use   • Smoking status: Never Smoker   • Smokeless tobacco: Never Used   Vaping Use   • Vaping Use: Never used   Substance and Sexual Activity   • Alcohol use: Yes     Comment: occasionally   • Drug use: No   • Sexual activity: Yes     Partners: Male       SURGICAL HISTORY   has a past surgical history that includes  delivery only (); tubal ligation; pelviscopy (3/7/2011); primary c section; shoulder arthroscopy (Right, ); and salpingectomy (3/7/2011).    CURRENT MEDICATIONS  Home Medications     Reviewed by Agustin Akbar R.N. (Registered Nurse) on 21 at 1212  Med List Status: Not Addressed   Medication Last Dose Status   amLODIPine (NORVASC) 5 MG Tab  Active   cyclobenzaprine (FLEXERIL) 5 mg tablet  Active   diclofenac sodium 1 % Gel  Active   methylPREDNISolone (MEDROL DOSEPAK) 4 MG Tablet Therapy Pack  Active   naproxen (NAPROSYN) 500 MG Tab  Active   NAPROXEN PO  Active   OMEPRAZOLE PO  Active                ALLERGIES  No Known Allergies    REVIEW OF SYSTEMS  See HPI for further details. Review of systems as above, otherwise all other systems are negative.  "    PHYSICAL EXAM  VITAL SIGNS: BP (!) 168/100   Pulse 60   Temp 36.6 °C (97.9 °F) (Temporal)   Resp 18   Ht 1.651 m (5' 5\")   Wt 85.1 kg (187 lb 9.8 oz)   LMP 03/01/2011   SpO2 99%   BMI 31.22 kg/m²     Constitutional: Well developed, well nourished. No acute distress.  HEENT: Normocephalic, atraumatic. MMM, right TM with mild erythema, left TM clear.  Neck: Supple, Full range of motion   Chest/Pulmonary:  No respiratory distress.  Equal expansion   Musculoskeletal: No deformity, no edema, neurovascular intact.   Neuro: Clear speech, appropriate, cooperative, cranial nerves II-XII grossly intact.  Psych: Normal mood and affect      DX-CHEST-LIMITED (1 VIEW)   Final Result         No acute cardiac or pulmonary abnormality is identified.            PROCEDURES     MEDICAL RECORD  I have reviewed patient's medical record and pertinent results are listed.    COURSE & MEDICAL DECISION MAKING  I have reviewed any medical record information, laboratory studies and radiographic results as noted above.    I you have had any blood pressure issues while here in the emergency department, please see your doctor for a further evaluation or work up.    Differential diagnoses include but not limited to: pneumonia, OM,     This patient presents with otitis media and cough.  At this time, I have counseled the patient/family regarding their medications, pain control, and follow up.  They will continue their medications, if any, as prescribed.  They will return immediately for any worsening symptoms and/or any other medical concerns.  They will see their doctor, or contact the doctor provided, in 1-2 days for follow up.       FINAL IMPRESSION  Otitis media  Cough    Electronically signed by: Kip Leblanc D.O., 12/9/2021 2:30 PM      "

## 2021-12-09 NOTE — ED TRIAGE NOTES
Chief Complaint   Patient presents with   • Cough     x1 week   • Congestion     x1 week   • Ear Pain     x1 week     Pt ambulatory to triage for above complaints. Pt denies contact with anyone with covid and has been vaccinated.

## 2021-12-09 NOTE — ED NOTES
Agrees with triage note. Pt states has received both COVID vaccines and an influenza vaccine as well. Pt c/o s/s that have lasted approx 1 week

## 2021-12-10 ENCOUNTER — TELEPHONE (OUTPATIENT)
Dept: MEDICAL GROUP | Facility: PHYSICIAN GROUP | Age: 56
End: 2021-12-10

## 2021-12-10 NOTE — TELEPHONE ENCOUNTER
Phone conversation with patient, scheduled ED follow-up visit in her primary care office for 12/13/21.

## 2021-12-13 ENCOUNTER — OFFICE VISIT (OUTPATIENT)
Dept: MEDICAL GROUP | Facility: PHYSICIAN GROUP | Age: 56
End: 2021-12-13
Payer: COMMERCIAL

## 2021-12-13 VITALS
BODY MASS INDEX: 31.25 KG/M2 | SYSTOLIC BLOOD PRESSURE: 126 MMHG | OXYGEN SATURATION: 98 % | RESPIRATION RATE: 16 BRPM | WEIGHT: 187.6 LBS | TEMPERATURE: 98.7 F | HEIGHT: 65 IN | DIASTOLIC BLOOD PRESSURE: 60 MMHG | HEART RATE: 72 BPM

## 2021-12-13 DIAGNOSIS — R05.9 COUGH: ICD-10-CM

## 2021-12-13 DIAGNOSIS — H67.9 OTITIS MEDIA IN DISEASE CLASSIFIED ELSEWHERE, UNSPECIFIED LATERALITY: ICD-10-CM

## 2021-12-13 PROCEDURE — 99213 OFFICE O/P EST LOW 20 MIN: CPT | Performed by: STUDENT IN AN ORGANIZED HEALTH CARE EDUCATION/TRAINING PROGRAM

## 2021-12-13 RX ORDER — BENZONATATE 100 MG/1
100 CAPSULE ORAL 3 TIMES DAILY PRN
Qty: 60 CAPSULE | Refills: 0 | Status: SHIPPED | OUTPATIENT
Start: 2021-12-13

## 2021-12-13 ASSESSMENT — FIBROSIS 4 INDEX: FIB4 SCORE: 0.66

## 2021-12-13 NOTE — PROGRESS NOTES
"CC:  Diagnoses of Otitis media in disease classified elsewhere, unspecified laterality and Cough were pertinent to this visit.    HISTORY OF THE PRESENT ILLNESS: Patient is a 56 y.o. female. This pleasant patient is here today for ER follow up.  She is present with her daughter.  Her PCP is Dr.Bella Lisha OLIVIER.    1.  ER follow-up for cough and right-sided otitis media.  Patient presented to ER on 12/9/2021 and was prescribed 10 days of Augmentin as well as Tessalon Perles for cough.  She has been fully compliant with her Augmentin.  She presents today for ER follow-up.  She continues to have a dry hacking cough, body aches, some right-sided ear pain.  No subjective/objective fever.  No NVD.  She reports no rash.  Patient's dietary intake remains at baseline.      No problem-specific Assessment & Plan notes found for this encounter.      Current Outpatient Medications Ordered in Epic   Medication Sig Dispense Refill   • benzonatate (TESSALON) 100 MG Cap Take 1 Capsule by mouth 3 times a day as needed for Cough. 60 Capsule 0   • amoxicillin-clavulanate (AUGMENTIN) 875-125 MG Tab Take 1 Tablet by mouth 2 times a day. 20 Tablet 0   • amLODIPine (NORVASC) 5 MG Tab Take 1 Tablet by mouth every day. 90 Tablet 1   • cyclobenzaprine (FLEXERIL) 5 mg tablet Take 5 mg by mouth.     • diclofenac sodium 1 % Gel APPLY TO THE AFFECTED AREA FOUR TIMES DAILY AS NEEDED FOR PAIN     • methylPREDNISolone (MEDROL DOSEPAK) 4 MG Tablet Therapy Pack FOLLOW PACKAGE DIRECTIONS     • naproxen (NAPROSYN) 500 MG Tab TAKE 1 TABLET BY MOUTH TWICE DAILY AFTER MEALS AS NEEDED     • NAPROXEN PO Take  by mouth.     • OMEPRAZOLE PO Take 1 Tablet by mouth every day.       No current Psychiatric-ordered facility-administered medications on file.       ROS:   See HPI.    Objective:     Exam: /60 (BP Location: Left arm, Patient Position: Sitting, BP Cuff Size: Adult)   Pulse 72   Temp 37.1 °C (98.7 °F) (Temporal)   Resp 16   Ht 1.651 m (5' 5\")   Wt 85.1 " kg (187 lb 9.6 oz)   SpO2 98%  Body mass index is 31.22 kg/m².    General: Normal appearing. No distress.  HEENT: Bilateral tympanic membranes are without erythema/effusion.    Neck: Supple without JVD. Thyroid is not enlarged.  Pulmonary: Clear to ausculation.  Normal effort. No rales, ronchi, or wheezing.  Cardiovascular: Regular rate and rhythm without murmur. Radial pulses are intact and equal bilaterally.  Lymph: No cervical or supraclavicular lymph nodes are palpable.  There is submandibular tenderness.  Skin: Warm and dry.  No obvious lesions.    A chaperone was offered to the patient during today's exam. Patient declined chaperone.    Labs:   12/9/2021:  -Chest x-ray 1 view, no indication of acute cardiopulmonary process.    Assessment & Plan:   56 y.o. female with the following -    1.  Viral URI with complication of right-sided otitis media.  -Acute, stable.  -Benzonatate 100 mg 3 times daily.  Dispense 60.  Refill 0.  -Allegra-D per instructions for alleviation of ear congestion.  -Tylenol and/or ibuprofen per instructions on AVS.  -I have provided a work note for the patient.      Return if symptoms worsen or fail to improve.    Please note that this dictation was created using voice recognition software. I have made every reasonable attempt to correct obvious errors, but I expect that there are errors of grammar and possibly content that I did not discover before finalizing the note.    Vipul Sanchez PA-C 12/13/2021

## 2021-12-13 NOTE — LETTER
[address]  [facility]  [address]  [address]  [place] [id number]     December 13, 2021    Patient: Gabriela Prince   YOB: 1965   Date of Visit: 12/13/2021       To Whom It May Concern:    Gabriela Prince was seen and treated in the [facility] Emergency department on 12/9/2021 and in our department on 12/13/2021.     Please excuse her from work due to illness until 12/22/2021at which point she may return to work without restriction.    Sincerely,         Vipul Sanchez P.A.-C.

## 2021-12-13 NOTE — PATIENT INSTRUCTIONS
1) Allegra D per package instructions.    2) Tylenol 1000 mg up to three times per day as needed.    3) Ibuprofen 800 mg up to three times daily as needed.

## 2022-07-05 ENCOUNTER — OFFICE VISIT (OUTPATIENT)
Dept: MEDICAL GROUP | Facility: PHYSICIAN GROUP | Age: 57
End: 2022-07-05
Payer: COMMERCIAL

## 2022-07-05 VITALS
BODY MASS INDEX: 33.43 KG/M2 | HEART RATE: 65 BPM | HEIGHT: 65 IN | OXYGEN SATURATION: 97 % | TEMPERATURE: 97.5 F | DIASTOLIC BLOOD PRESSURE: 70 MMHG | SYSTOLIC BLOOD PRESSURE: 140 MMHG | WEIGHT: 200.62 LBS

## 2022-07-05 DIAGNOSIS — M65.4 TENOSYNOVITIS, DE QUERVAIN: ICD-10-CM

## 2022-07-05 PROCEDURE — 99213 OFFICE O/P EST LOW 20 MIN: CPT | Performed by: FAMILY MEDICINE

## 2022-07-05 RX ORDER — NAPROXEN 500 MG/1
500 TABLET ORAL 2 TIMES DAILY WITH MEALS
Qty: 60 TABLET | Refills: 0 | Status: SHIPPED | OUTPATIENT
Start: 2022-07-05

## 2022-07-05 ASSESSMENT — PATIENT HEALTH QUESTIONNAIRE - PHQ9: CLINICAL INTERPRETATION OF PHQ2 SCORE: 0

## 2022-07-05 ASSESSMENT — FIBROSIS 4 INDEX: FIB4 SCORE: 0.66

## 2022-07-06 NOTE — PROGRESS NOTES
"Subjective     Gabriela Prince is a 56 y.o. female who presents with Pain (Both wrist/)            HPI     Patient is here accompanied by daughter complaining of bilateral wrist pain worse on the right than the left which has been ongoing for about 2 weeks now.  The pain radiates to the forearms, elbows and shoulders.  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  She does a lot of work with her hands doing repetitive movements.  She is right-handed.  Denies any trauma.  She states she has not claiming this is under Worker's Comp.    Past medical history, past surgical history, family history reviewed-no changes    Social history reviewed-no changes    Allergies reviewed-no changes    Medications reviewed-no changes    ROS   As per HPI, the rest are negative.             Objective     BP (!) 140/70 (BP Location: Left arm, Patient Position: Sitting, BP Cuff Size: Adult)   Pulse 65   Temp 36.4 °C (97.5 °F) (Temporal)   Ht 1.651 m (5' 5\")   Wt 91 kg (200 lb 9.9 oz)   LMP 03/01/2011   SpO2 97%   BMI 33.38 kg/m²      Physical Exam     Examined alert, awake, oriented, not in distress    Extremities- strong  both hands, there is mild swelling of the right wrist but no erythema, very tender on palpation radial side of the right wrist, intact neurovascular status, markedly positive Finkelstein's test right greater than the left                        Assessment & Plan        1. 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  We discussed warm compresses, anti-inflammatories and wearing thumb spica splint.  Prescription was given so she can get the splints from medical supply store.  Naproxen 500 mg 1 tablet twice a day with food.  Made her aware that this is a work-related problem and that she needs to rest her hands and upper extremities for " this to get better.  She said she has a vacation coming up next month for 2 weeks.  I told her if there is no improvement with conservative measures mentioned above then she can message me so we can send her to hand orthopedist and she may benefit from steroid injection.    Follow-up as needed.      Please note that this dictation was created using voice recognition software. I have worked with consultants from the vendor as well as technical experts from [facility] to optimize the interface. I have made every reasonable attempt to correct obvious errors, but I expect that there are errors of grammar and possibly content I did not discover before finalizing the note.